# Patient Record
Sex: FEMALE | Race: BLACK OR AFRICAN AMERICAN | NOT HISPANIC OR LATINO
[De-identification: names, ages, dates, MRNs, and addresses within clinical notes are randomized per-mention and may not be internally consistent; named-entity substitution may affect disease eponyms.]

---

## 2020-04-26 ENCOUNTER — MESSAGE (OUTPATIENT)
Age: 40
End: 2020-04-26

## 2020-05-14 LAB
SARS-COV-2 IGG SERPL IA-ACNC: 6.9 INDEX
SARS-COV-2 IGG SERPL QL IA: POSITIVE

## 2020-09-30 ENCOUNTER — EMERGENCY (EMERGENCY)
Facility: HOSPITAL | Age: 40
LOS: 1 days | Discharge: HOME | End: 2020-09-30
Attending: STUDENT IN AN ORGANIZED HEALTH CARE EDUCATION/TRAINING PROGRAM
Payer: COMMERCIAL

## 2020-09-30 VITALS
TEMPERATURE: 98 F | SYSTOLIC BLOOD PRESSURE: 149 MMHG | OXYGEN SATURATION: 100 % | HEART RATE: 92 BPM | HEIGHT: 66 IN | RESPIRATION RATE: 18 BRPM | DIASTOLIC BLOOD PRESSURE: 104 MMHG | WEIGHT: 195.11 LBS

## 2020-09-30 VITALS
SYSTOLIC BLOOD PRESSURE: 121 MMHG | RESPIRATION RATE: 18 BRPM | HEART RATE: 79 BPM | OXYGEN SATURATION: 100 % | DIASTOLIC BLOOD PRESSURE: 77 MMHG | TEMPERATURE: 98 F

## 2020-09-30 DIAGNOSIS — N93.9 ABNORMAL UTERINE AND VAGINAL BLEEDING, UNSPECIFIED: ICD-10-CM

## 2020-09-30 LAB
ANION GAP SERPL CALC-SCNC: 6 MMOL/L — SIGNIFICANT CHANGE UP (ref 5–17)
APPEARANCE UR: CLEAR — SIGNIFICANT CHANGE UP
APTT BLD: 32.7 SEC — SIGNIFICANT CHANGE UP (ref 27.5–35.5)
BASOPHILS # BLD AUTO: 0.02 K/UL — SIGNIFICANT CHANGE UP (ref 0–0.2)
BASOPHILS NFR BLD AUTO: 0.3 % — SIGNIFICANT CHANGE UP (ref 0–2)
BILIRUB UR-MCNC: NEGATIVE — SIGNIFICANT CHANGE UP
BLD GP AB SCN SERPL QL: SIGNIFICANT CHANGE UP
BUN SERPL-MCNC: 7 MG/DL — SIGNIFICANT CHANGE UP (ref 7–23)
CALCIUM SERPL-MCNC: 8.8 MG/DL — SIGNIFICANT CHANGE UP (ref 8.5–10.1)
CHLORIDE SERPL-SCNC: 105 MMOL/L — SIGNIFICANT CHANGE UP (ref 96–108)
CO2 SERPL-SCNC: 26 MMOL/L — SIGNIFICANT CHANGE UP (ref 22–31)
COLOR SPEC: YELLOW — SIGNIFICANT CHANGE UP
CREAT SERPL-MCNC: 0.56 MG/DL — SIGNIFICANT CHANGE UP (ref 0.5–1.3)
DIFF PNL FLD: ABNORMAL
EOSINOPHIL # BLD AUTO: 0.02 K/UL — SIGNIFICANT CHANGE UP (ref 0–0.5)
EOSINOPHIL NFR BLD AUTO: 0.3 % — SIGNIFICANT CHANGE UP (ref 0–6)
EPI CELLS # UR: SIGNIFICANT CHANGE UP
GLUCOSE SERPL-MCNC: 210 MG/DL — HIGH (ref 70–99)
GLUCOSE UR QL: 1000 MG/DL
HCG SERPL-ACNC: 375 MIU/ML — HIGH
HCG UR QL: POSITIVE
HCT VFR BLD CALC: 34.3 % — LOW (ref 34.5–45)
HGB BLD-MCNC: 11.3 G/DL — LOW (ref 11.5–15.5)
IMM GRANULOCYTES NFR BLD AUTO: 0.3 % — SIGNIFICANT CHANGE UP (ref 0–1.5)
INR BLD: 1.15 RATIO — SIGNIFICANT CHANGE UP (ref 0.88–1.16)
KETONES UR-MCNC: NEGATIVE — SIGNIFICANT CHANGE UP
LEUKOCYTE ESTERASE UR-ACNC: NEGATIVE — SIGNIFICANT CHANGE UP
LYMPHOCYTES # BLD AUTO: 2.25 K/UL — SIGNIFICANT CHANGE UP (ref 1–3.3)
LYMPHOCYTES # BLD AUTO: 29.2 % — SIGNIFICANT CHANGE UP (ref 13–44)
MCHC RBC-ENTMCNC: 26.8 PG — LOW (ref 27–34)
MCHC RBC-ENTMCNC: 32.9 GM/DL — SIGNIFICANT CHANGE UP (ref 32–36)
MCV RBC AUTO: 81.5 FL — SIGNIFICANT CHANGE UP (ref 80–100)
MONOCYTES # BLD AUTO: 0.57 K/UL — SIGNIFICANT CHANGE UP (ref 0–0.9)
MONOCYTES NFR BLD AUTO: 7.4 % — SIGNIFICANT CHANGE UP (ref 2–14)
NEUTROPHILS # BLD AUTO: 4.83 K/UL — SIGNIFICANT CHANGE UP (ref 1.8–7.4)
NEUTROPHILS NFR BLD AUTO: 62.5 % — SIGNIFICANT CHANGE UP (ref 43–77)
NITRITE UR-MCNC: NEGATIVE — SIGNIFICANT CHANGE UP
NRBC # BLD: 0 /100 WBCS — SIGNIFICANT CHANGE UP (ref 0–0)
PH UR: 7 — SIGNIFICANT CHANGE UP (ref 5–8)
PLATELET # BLD AUTO: 268 K/UL — SIGNIFICANT CHANGE UP (ref 150–400)
POTASSIUM SERPL-MCNC: 3.5 MMOL/L — SIGNIFICANT CHANGE UP (ref 3.5–5.3)
POTASSIUM SERPL-SCNC: 3.5 MMOL/L — SIGNIFICANT CHANGE UP (ref 3.5–5.3)
PROT UR-MCNC: NEGATIVE MG/DL — SIGNIFICANT CHANGE UP
PROTHROM AB SERPL-ACNC: 13.2 SEC — SIGNIFICANT CHANGE UP (ref 10.6–13.6)
RBC # BLD: 4.21 M/UL — SIGNIFICANT CHANGE UP (ref 3.8–5.2)
RBC # FLD: 13.7 % — SIGNIFICANT CHANGE UP (ref 10.3–14.5)
RBC CASTS # UR COMP ASSIST: ABNORMAL /HPF (ref 0–4)
SODIUM SERPL-SCNC: 137 MMOL/L — SIGNIFICANT CHANGE UP (ref 135–145)
SP GR SPEC: 1.01 — SIGNIFICANT CHANGE UP (ref 1.01–1.02)
UROBILINOGEN FLD QL: NEGATIVE MG/DL — SIGNIFICANT CHANGE UP
WBC # BLD: 7.71 K/UL — SIGNIFICANT CHANGE UP (ref 3.8–10.5)
WBC # FLD AUTO: 7.71 K/UL — SIGNIFICANT CHANGE UP (ref 3.8–10.5)

## 2020-09-30 PROCEDURE — 99285 EMERGENCY DEPT VISIT HI MDM: CPT

## 2020-09-30 PROCEDURE — 76830 TRANSVAGINAL US NON-OB: CPT | Mod: 26

## 2020-09-30 RX ORDER — SODIUM CHLORIDE 9 MG/ML
1000 INJECTION INTRAMUSCULAR; INTRAVENOUS; SUBCUTANEOUS ONCE
Refills: 0 | Status: COMPLETED | OUTPATIENT
Start: 2020-09-30 | End: 2020-09-30

## 2020-09-30 RX ADMIN — SODIUM CHLORIDE 1000 MILLILITER(S): 9 INJECTION INTRAMUSCULAR; INTRAVENOUS; SUBCUTANEOUS at 12:18

## 2020-09-30 NOTE — ED PROVIDER NOTE - CLINICAL SUMMARY MEDICAL DECISION MAKING FREE TEXT BOX
39 yo F with positive home pregnancy test, no confirmed IUP, presenting with vaginal bleeding. Labs including t&s, IVF, analgesia, US r/o ectopic. VSS, no signs of symptomatic anemia.

## 2020-09-30 NOTE — ED ADULT NURSE NOTE - NSIMPLEMENTINTERV_GEN_ALL_ED
Implemented All Universal Safety Interventions:  [place] to call system. Call bell, personal items and telephone within reach. Instruct patient to call for assistance. Room bathroom lighting operational. Non-slip footwear when patient is off stretcher. Physically safe environment: no spills, clutter or unnecessary equipment. Stretcher in lowest position, wheels locked, appropriate side rails in place.
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
## 2020-09-30 NOTE — ED ADULT TRIAGE NOTE - CHIEF COMPLAINT QUOTE
pt c/o vaginal bleed since Tuesday mild to moderate with clots. pt states she tested positive but no confirmed IUP. c/o generalize pain and cramping. "this is the feeling I get when I have my period"

## 2020-09-30 NOTE — ED PROVIDER NOTE - PHYSICAL EXAMINATION
VITALS: reviewed  GEN: NAD, A & O x 4  HEAD/EYES: NCAT, PERRL, EOMI, anicteric sclerae, no conjunctival pallor  ENT: mucus membranes moist, oropharynx WNL, trachea midline  RESP: lungs CTA with equal breath sounds bilaterally, chest wall nontender and atraumatic  CV: heart with reg rhythm S1, S2, no murmur; distal pulses intact and symmetric bilaterally  ABDOMEN: normoactive bowel sounds, soft, nondistended, suprapubic ttp, no palpable masses  : no CVAT, normal external genitalia, blood pooling in vault unable to visualize os ([employer] student)  MSK: extremities atraumatic and nontender, no edema, no asymmetry.   SKIN: warm, dry, no rash, no bruising, no cyanosis. color appropriate for ethnicity  NEURO: alert, mentating appropriately, no facial asymmetry. gross sensation, motor, coordination are intact  PSYCH: Affect appropriate  '

## 2020-09-30 NOTE — ED ADULT NURSE NOTE - OBJECTIVE STATEMENT
pt c/o vaginal bleeding started yesterday and intermittent lower abdominal pain started today. pt positive pregnancy test. lmp 8/11/2020

## 2020-09-30 NOTE — ED PROVIDER NOTE - PROGRESS NOTE DETAILS
Cecilia: hcg 300, no iup or ectopic seen on  US, discussed results with patient and pregnancy of unknown origin which may be ectopic versus miscarriage. Patient instructed to return to ED in 48 hours for repeat beta-hcg check and need for repeat US in one week with ob fu. discussed with Dr. Colon.

## 2020-09-30 NOTE — ED PROVIDER NOTE - NSFOLLOWUPINSTRUCTIONS_ED_ALL_ED_FT
You presented to the ED with vaginal bleeding in the setting of pregnancy. No pregnancy can be seen in your uterus at this time, so we are unable to confirm the location of the pregnancy. It is possible you have had a miscarriage or it is possible that the pregnancy is outside of the uterus (ectopic pregnancy) which can cause rupture and bleeding and can be life threatening. It is important you return to the emergency department in two days to have your pregnancy hormone (HCG) checked and that you have a repeat ultrasound done within a week to determine location of pregnancy. Return to ED right away if you are soaking through more than 2 pads per hour, if you develop palpitations, shortness of breath, dizziness, or if you feel like you are going to pass out.

## 2020-09-30 NOTE — ED PROVIDER NOTE - CARE PROVIDER_API CALL
Ezra Colon  OBSTETRICS AND GYNECOLOGY  [address] [place] [address]  Phone: [phone]  Fax: [phone]  Follow Up Time:

## 2020-10-01 LAB
CULTURE RESULTS: SIGNIFICANT CHANGE UP
SPECIMEN SOURCE: SIGNIFICANT CHANGE UP

## 2020-10-02 ENCOUNTER — EMERGENCY (EMERGENCY)
Facility: HOSPITAL | Age: 40
LOS: 0 days | Discharge: HOME | End: 2020-10-02
Attending: STUDENT IN AN ORGANIZED HEALTH CARE EDUCATION/TRAINING PROGRAM
Payer: COMMERCIAL

## 2020-10-02 VITALS
RESPIRATION RATE: 17 BRPM | TEMPERATURE: 99 F | OXYGEN SATURATION: 100 % | SYSTOLIC BLOOD PRESSURE: 125 MMHG | HEIGHT: 66 IN | WEIGHT: 195.11 LBS | HEART RATE: 71 BPM | DIASTOLIC BLOOD PRESSURE: 78 MMHG

## 2020-10-02 DIAGNOSIS — O03.9 COMPLETE OR UNSPECIFIED SPONTANEOUS ABORTION WITHOUT COMPLICATION: ICD-10-CM

## 2020-10-02 LAB
ANION GAP SERPL CALC-SCNC: 4 MMOL/L — LOW (ref 5–17)
BUN SERPL-MCNC: 6 MG/DL — LOW (ref 7–23)
CALCIUM SERPL-MCNC: 9 MG/DL — SIGNIFICANT CHANGE UP (ref 8.5–10.1)
CHLORIDE SERPL-SCNC: 105 MMOL/L — SIGNIFICANT CHANGE UP (ref 96–108)
CO2 SERPL-SCNC: 29 MMOL/L — SIGNIFICANT CHANGE UP (ref 22–31)
CREAT SERPL-MCNC: 0.57 MG/DL — SIGNIFICANT CHANGE UP (ref 0.5–1.3)
GLUCOSE SERPL-MCNC: 161 MG/DL — HIGH (ref 70–99)
HCG SERPL-ACNC: 76 MIU/ML — HIGH
HCT VFR BLD CALC: 35.7 % — SIGNIFICANT CHANGE UP (ref 34.5–45)
HGB BLD-MCNC: 11.5 G/DL — SIGNIFICANT CHANGE UP (ref 11.5–15.5)
MCHC RBC-ENTMCNC: 26.6 PG — LOW (ref 27–34)
MCHC RBC-ENTMCNC: 32.2 GM/DL — SIGNIFICANT CHANGE UP (ref 32–36)
MCV RBC AUTO: 82.4 FL — SIGNIFICANT CHANGE UP (ref 80–100)
NRBC # BLD: 0 /100 WBCS — SIGNIFICANT CHANGE UP (ref 0–0)
PLATELET # BLD AUTO: 291 K/UL — SIGNIFICANT CHANGE UP (ref 150–400)
POTASSIUM SERPL-MCNC: 4.3 MMOL/L — SIGNIFICANT CHANGE UP (ref 3.5–5.3)
POTASSIUM SERPL-SCNC: 4.3 MMOL/L — SIGNIFICANT CHANGE UP (ref 3.5–5.3)
RBC # BLD: 4.33 M/UL — SIGNIFICANT CHANGE UP (ref 3.8–5.2)
RBC # FLD: 13.7 % — SIGNIFICANT CHANGE UP (ref 10.3–14.5)
SODIUM SERPL-SCNC: 138 MMOL/L — SIGNIFICANT CHANGE UP (ref 135–145)
WBC # BLD: 5.65 K/UL — SIGNIFICANT CHANGE UP (ref 3.8–10.5)
WBC # FLD AUTO: 5.65 K/UL — SIGNIFICANT CHANGE UP (ref 3.8–10.5)

## 2020-10-02 PROCEDURE — 99284 EMERGENCY DEPT VISIT MOD MDM: CPT

## 2020-10-02 NOTE — ED PROVIDER NOTE - PATIENT PORTAL LINK FT
You can access the FollowMyHealth Patient Portal offered by [facility] by registering at the following website: [email or web address]. By joining [employer]’s FollowMyHealth portal, you will also be able to view your health information using other applications (apps) compatible with our system.

## 2020-10-02 NOTE — ED ADULT NURSE NOTE - CHIEF COMPLAINT QUOTE
39 y/o female with no PMH. Presents to the ED with vaginal bleeding for the past 3 days. lmp 8/11/2020. Was at the hospital on Wednesday and told to come back for repeat hcg blood work. pt has intermittent lower pelvic pain, and scant bleeding on panty liner.

## 2020-10-02 NOTE — ED PROVIDER NOTE - OBJECTIVE STATEMENT
40F seen in ED 3 days ago for vaginal bleeding in pregnancy. d/c after workup showing no IUP. returning for [facility] trend. since d/c decreased bleeding. feeling well.

## 2020-10-02 NOTE — ED PROVIDER NOTE - CLINICAL SUMMARY MEDICAL DECISION MAKING FREE TEXT BOX
VS wnl. Hgb lateral. hcg downtrending from 375 > 76. likely representing loss of pregnancy. patient has ob follow up. no hemmorhage. pelvic exam deferred per patient request. d/c. rh+ per prior testing

## 2020-10-02 NOTE — ED ADULT TRIAGE NOTE - CHIEF COMPLAINT QUOTE
41 y/o female with no PMH. Presents to the ED with vaginal bleeding for the past 3 days. lmp 8/11/2020. Was at the hospital on Wednesday and told to come back for repeat hcg blood work. pt has intermittent lower pelvic pain, and scant bleeding on panty liner.

## 2021-01-21 ENCOUNTER — EMERGENCY (EMERGENCY)
Facility: HOSPITAL | Age: 41
LOS: 0 days | Discharge: HOME | End: 2021-01-21
Attending: EMERGENCY MEDICINE
Payer: COMMERCIAL

## 2021-01-21 VITALS
RESPIRATION RATE: 18 BRPM | HEART RATE: 78 BPM | DIASTOLIC BLOOD PRESSURE: 81 MMHG | SYSTOLIC BLOOD PRESSURE: 120 MMHG | TEMPERATURE: 98 F | OXYGEN SATURATION: 99 %

## 2021-01-21 VITALS
HEART RATE: 74 BPM | OXYGEN SATURATION: 100 % | TEMPERATURE: 98 F | DIASTOLIC BLOOD PRESSURE: 94 MMHG | HEIGHT: 66 IN | WEIGHT: 197.98 LBS | SYSTOLIC BLOOD PRESSURE: 124 MMHG | RESPIRATION RATE: 16 BRPM
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
## 2021-01-21 DIAGNOSIS — M54.9 DORSALGIA, UNSPECIFIED: ICD-10-CM
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
## 2021-01-21 DIAGNOSIS — S13.4XXA SPRAIN OF LIGAMENTS OF CERVICAL SPINE, INITIAL ENCOUNTER: ICD-10-CM

## 2021-01-21 DIAGNOSIS — Z04.1 ENCOUNTER FOR EXAMINATION AND OBSERVATION FOLLOWING TRANSPORT ACCIDENT: ICD-10-CM

## 2021-01-21 DIAGNOSIS — M79.10 MYALGIA, UNSPECIFIED SITE: ICD-10-CM

## 2021-01-21 DIAGNOSIS — E11.65 TYPE 2 DIABETES MELLITUS WITH HYPERGLYCEMIA: ICD-10-CM

## 2021-01-21 DIAGNOSIS — M54.2 CERVICALGIA: ICD-10-CM

## 2021-01-21 LAB
ALBUMIN SERPL ELPH-MCNC: 3.5 G/DL — SIGNIFICANT CHANGE UP (ref 3.3–5)
ALP SERPL-CCNC: 115 U/L — SIGNIFICANT CHANGE UP (ref 40–120)
ALT FLD-CCNC: 28 U/L — SIGNIFICANT CHANGE UP (ref 12–78)
ANION GAP SERPL CALC-SCNC: 7 MMOL/L — SIGNIFICANT CHANGE UP (ref 5–17)
APTT BLD: 20.6 SEC — LOW (ref 27.5–35.5)
AST SERPL-CCNC: 22 U/L — SIGNIFICANT CHANGE UP (ref 15–37)
BASOPHILS # BLD AUTO: 0.01 K/UL — SIGNIFICANT CHANGE UP (ref 0–0.2)
BASOPHILS NFR BLD AUTO: 0.1 % — SIGNIFICANT CHANGE UP (ref 0–2)
BILIRUB SERPL-MCNC: 0.3 MG/DL — SIGNIFICANT CHANGE UP (ref 0.2–1.2)
BUN SERPL-MCNC: 9 MG/DL — SIGNIFICANT CHANGE UP (ref 7–23)
CALCIUM SERPL-MCNC: 9.2 MG/DL — SIGNIFICANT CHANGE UP (ref 8.5–10.1)
CHLORIDE SERPL-SCNC: 102 MMOL/L — SIGNIFICANT CHANGE UP (ref 96–108)
CO2 SERPL-SCNC: 25 MMOL/L — SIGNIFICANT CHANGE UP (ref 22–31)
CREAT SERPL-MCNC: 0.75 MG/DL — SIGNIFICANT CHANGE UP (ref 0.5–1.3)
EOSINOPHIL # BLD AUTO: 0.01 K/UL — SIGNIFICANT CHANGE UP (ref 0–0.5)
EOSINOPHIL NFR BLD AUTO: 0.1 % — SIGNIFICANT CHANGE UP (ref 0–6)
GLUCOSE SERPL-MCNC: 285 MG/DL — HIGH (ref 70–99)
HCG SERPL-ACNC: <1 MIU/ML — SIGNIFICANT CHANGE UP
HCT VFR BLD CALC: 41.5 % — SIGNIFICANT CHANGE UP (ref 34.5–45)
HGB BLD-MCNC: 13.3 G/DL — SIGNIFICANT CHANGE UP (ref 11.5–15.5)
IMM GRANULOCYTES NFR BLD AUTO: 0.3 % — SIGNIFICANT CHANGE UP (ref 0–1.5)
INR BLD: 1.07 RATIO — SIGNIFICANT CHANGE UP (ref 0.88–1.16)
LYMPHOCYTES # BLD AUTO: 3.21 K/UL — SIGNIFICANT CHANGE UP (ref 1–3.3)
LYMPHOCYTES # BLD AUTO: 47.6 % — HIGH (ref 13–44)
MCHC RBC-ENTMCNC: 26.1 PG — LOW (ref 27–34)
MCHC RBC-ENTMCNC: 32 GM/DL — SIGNIFICANT CHANGE UP (ref 32–36)
MCV RBC AUTO: 81.4 FL — SIGNIFICANT CHANGE UP (ref 80–100)
MONOCYTES # BLD AUTO: 0.52 K/UL — SIGNIFICANT CHANGE UP (ref 0–0.9)
MONOCYTES NFR BLD AUTO: 7.7 % — SIGNIFICANT CHANGE UP (ref 2–14)
NEUTROPHILS # BLD AUTO: 2.98 K/UL — SIGNIFICANT CHANGE UP (ref 1.8–7.4)
NEUTROPHILS NFR BLD AUTO: 44.2 % — SIGNIFICANT CHANGE UP (ref 43–77)
NRBC # BLD: 0 /100 WBCS — SIGNIFICANT CHANGE UP (ref 0–0)
PLATELET # BLD AUTO: 245 K/UL — SIGNIFICANT CHANGE UP (ref 150–400)
POTASSIUM SERPL-MCNC: 3.9 MMOL/L — SIGNIFICANT CHANGE UP (ref 3.5–5.3)
POTASSIUM SERPL-SCNC: 3.9 MMOL/L — SIGNIFICANT CHANGE UP (ref 3.5–5.3)
PROT SERPL-MCNC: 8.1 GM/DL — SIGNIFICANT CHANGE UP (ref 6–8.3)
PROTHROM AB SERPL-ACNC: 12.4 SEC — SIGNIFICANT CHANGE UP (ref 10.6–13.6)
RBC # BLD: 5.1 M/UL — SIGNIFICANT CHANGE UP (ref 3.8–5.2)
RBC # FLD: 13 % — SIGNIFICANT CHANGE UP (ref 10.3–14.5)
SODIUM SERPL-SCNC: 134 MMOL/L — LOW (ref 135–145)
WBC # BLD: 6.75 K/UL — SIGNIFICANT CHANGE UP (ref 3.8–10.5)
WBC # FLD AUTO: 6.75 K/UL — SIGNIFICANT CHANGE UP (ref 3.8–10.5)

## 2021-01-21 PROCEDURE — 72125 CT NECK SPINE W/O DYE: CPT | Mod: 26

## 2021-01-21 PROCEDURE — 99053 MED SERV 10PM-8AM 24 HR FAC: CPT

## 2021-01-21 PROCEDURE — 99284 EMERGENCY DEPT VISIT MOD MDM: CPT

## 2021-01-21 PROCEDURE — 70450 CT HEAD/BRAIN W/O DYE: CPT | Mod: 26

## 2021-01-21 PROCEDURE — 71260 CT THORAX DX C+: CPT | Mod: 26,MA

## 2021-01-21 PROCEDURE — 73562 X-RAY EXAM OF KNEE 3: CPT | Mod: 26,50

## 2021-01-21 PROCEDURE — 74177 CT ABD & PELVIS W/CONTRAST: CPT | Mod: 26,MA

## 2021-01-21 PROCEDURE — 73030 X-RAY EXAM OF SHOULDER: CPT | Mod: 26,50

## 2021-01-21 PROCEDURE — 71045 X-RAY EXAM CHEST 1 VIEW: CPT | Mod: 26

## 2021-01-21 RX ORDER — MORPHINE SULFATE 50 MG/1
4 CAPSULE, EXTENDED RELEASE ORAL ONCE
Refills: 0 | Status: DISCONTINUED | OUTPATIENT
Start: 2021-01-21 | End: 2021-01-21

## 2021-01-21 RX ORDER — METHOCARBAMOL 500 MG/1
1 TABLET, FILM COATED ORAL
Qty: 15 | Refills: 0
Start: 2021-01-21 | End: 2021-01-25

## 2021-01-21 RX ORDER — SODIUM CHLORIDE 9 MG/ML
1000 INJECTION INTRAMUSCULAR; INTRAVENOUS; SUBCUTANEOUS ONCE
Refills: 0 | Status: COMPLETED | OUTPATIENT
Start: 2021-01-21 | End: 2021-01-21

## 2021-01-21 RX ORDER — IBUPROFEN 200 MG
1 TABLET ORAL
Qty: 20 | Refills: 0
Start: 2021-01-21 | End: 2021-01-25

## 2021-01-21 RX ADMIN — SODIUM CHLORIDE 1000 MILLILITER(S): 9 INJECTION INTRAMUSCULAR; INTRAVENOUS; SUBCUTANEOUS at 03:45

## 2021-01-21 RX ADMIN — SODIUM CHLORIDE 1000 MILLILITER(S): 9 INJECTION INTRAMUSCULAR; INTRAVENOUS; SUBCUTANEOUS at 01:20

## 2021-01-21 RX ADMIN — MORPHINE SULFATE 4 MILLIGRAM(S): 50 CAPSULE, EXTENDED RELEASE ORAL at 01:20
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
## 2021-01-21 NOTE — ED PROVIDER NOTE - PHYSICAL EXAMINATION
Gen: Alert, distress with movement, laying quietly  Head: NC, AT, PERRL, EOMI, normal lids/conjunctiva  ENT: normal hearing, patent oropharynx without erythema/exudate, uvula midline  Neck: C collar, +midline neck ttp, meningismus/JVD, +Trachea midline  Pulm: Bilateral BS, normal resp effort, no wheeze/stridor/retractions  CV: RRR, no M/R/G, +dist pulses  Abd: soft, NT/ND, Negative [place] signs, +BS, no palpable masses  Mskel: no edema/erythema/cyanosis, diffuse mildline back and flank ttp, nojoint swelling  Skin: no rash, warm/dry  Neuro: AAOx3, no apparent sensory deficits, coordination intact, unable to examine motor strength

## 2021-01-21 NOTE — ED PROVIDER NOTE - WR INTERPRETATION 2
MSK XR negative - No fracture, No dislocation, No foreign bodyCXR negative - No pneumothorax, No opacities, No free air

## 2021-01-21 NOTE — ED PROVIDER NOTE - PATIENT PORTAL LINK FT
You can access the FollowMyHealth Patient Portal offered by [facility] by registering at the following website: [email or web address]. By joining [employer]’s FollowMyHealth portal, you will also be able to view your health information using other applications (apps) compatible with our system.
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
## 2021-12-24 ENCOUNTER — EMERGENCY (EMERGENCY)
Facility: HOSPITAL | Age: 41
LOS: 0 days | Discharge: HOME | End: 2021-12-24
Attending: STUDENT IN AN ORGANIZED HEALTH CARE EDUCATION/TRAINING PROGRAM
Payer: COMMERCIAL

## 2021-12-24 VITALS
TEMPERATURE: 98 F | HEART RATE: 88 BPM | SYSTOLIC BLOOD PRESSURE: 121 MMHG | WEIGHT: 205.03 LBS | RESPIRATION RATE: 19 BRPM | OXYGEN SATURATION: 99 % | HEIGHT: 66 IN | DIASTOLIC BLOOD PRESSURE: 81 MMHG

## 2021-12-24 VITALS
DIASTOLIC BLOOD PRESSURE: 70 MMHG | HEART RATE: 70 BPM | SYSTOLIC BLOOD PRESSURE: 107 MMHG | OXYGEN SATURATION: 99 % | RESPIRATION RATE: 17 BRPM
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
## 2021-12-24 DIAGNOSIS — E11.9 TYPE 2 DIABETES MELLITUS WITHOUT COMPLICATIONS: ICD-10-CM

## 2021-12-24 DIAGNOSIS — M54.2 CERVICALGIA: ICD-10-CM
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
## 2021-12-24 DIAGNOSIS — S29.012A STRAIN OF MUSCLE AND TENDON OF BACK WALL OF THORAX, INITIAL ENCOUNTER: ICD-10-CM

## 2021-12-24 DIAGNOSIS — M54.89 OTHER DORSALGIA: ICD-10-CM

## 2021-12-24 PROCEDURE — 99283 EMERGENCY DEPT VISIT LOW MDM: CPT

## 2021-12-24 RX ORDER — ACETAMINOPHEN 500 MG
975 TABLET ORAL ONCE
Refills: 0 | Status: COMPLETED | OUTPATIENT
Start: 2021-12-24 | End: 2021-12-24

## 2021-12-24 RX ORDER — DIAZEPAM 5 MG
5 TABLET ORAL ONCE
Refills: 0 | Status: DISCONTINUED | OUTPATIENT
Start: 2021-12-24 | End: 2021-12-24

## 2021-12-24 RX ORDER — LIDOCAINE 4 G/100G
1 CREAM TOPICAL ONCE
Refills: 0 | Status: COMPLETED | OUTPATIENT
Start: 2021-12-24 | End: 2021-12-24

## 2021-12-24 RX ORDER — METHOCARBAMOL 500 MG/1
750 TABLET, FILM COATED ORAL ONCE
Refills: 0 | Status: COMPLETED | OUTPATIENT
Start: 2021-12-24 | End: 2021-12-24

## 2021-12-24 RX ORDER — IBUPROFEN 200 MG
600 TABLET ORAL ONCE
Refills: 0 | Status: COMPLETED | OUTPATIENT
Start: 2021-12-24 | End: 2021-12-24

## 2021-12-24 RX ADMIN — Medication 975 MILLIGRAM(S): at 09:28

## 2021-12-24 RX ADMIN — LIDOCAINE 1 PATCH: 4 CREAM TOPICAL at 10:21

## 2021-12-24 RX ADMIN — METHOCARBAMOL 750 MILLIGRAM(S): 500 TABLET, FILM COATED ORAL at 10:24

## 2021-12-24 RX ADMIN — Medication 5 MILLIGRAM(S): at 11:51

## 2021-12-24 RX ADMIN — Medication 975 MILLIGRAM(S): at 11:41
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
## 2021-12-24 NOTE — ED PROVIDER NOTE - MUSCULOSKELETAL, MLM
Spine appears normal, no c/t/l midline spinal ttp or stepoff, c/t paraspinal hypertonicity without tenderness. strength and sensation intact.

## 2021-12-24 NOTE — ED ADULT NURSE NOTE - NSIMPLEMENTINTERV_GEN_ALL_ED
Implemented All Universal Safety Interventions:  [place] to call system. Call bell, personal items and telephone within reach. Instruct patient to call for assistance. Room bathroom lighting operational. Non-slip footwear when patient is off stretcher. Physically safe environment: no spills, clutter or unnecessary equipment. Stretcher in lowest position, wheels locked, appropriate side rails in place.

## 2021-12-24 NOTE — ED PROVIDER NOTE - NSFOLLOWUPINSTRUCTIONS_ED_ALL_ED_FT
No signs of emergency medical condition on today's workup.  Presumptive diagnosis made, but further evaluation may be required by your primary care doctor or specialist for a definitive diagnosis.  Therefore, follow up as directed and if symptoms change/worsen or any emergency conditions, please return to the ER.   you were seen in the emergency department today for back pain   you were given medication to help alleviate the pain.   would recommend back stretches and heat to help with the pain.   motrin 600mg every 6 hours, can also take tylenol 500-1000mg every 6 hours.   salonpas patches can be found over the counter.   take prescribed muscle relaxant as instructed.   follow up with orthopedics in the next week.     Back pain is very common in adults. The cause of back pain is rarely dangerous and the pain often gets better over time. The cause of your back pain may not be known and may include strain of muscles or ligaments, degeneration of the spinal disks, or arthritis. Occasionally the pain may radiate down your leg(s). Over-the-counter medicines to reduce pain and inflammation are often the most helpful. Stretching and remaining active frequently helps the healing process.     Low Back Strain  A strain is a stretch or tear in a muscle or the strong cords of tissue that attach muscle to bone (tendons). Strains of the lower back (lumbar spine) are a common cause of low back pain. A strain occurs when muscles or tendons are torn or are stretched beyond their limits. The muscles may become inflamed, resulting in pain and sudden muscle tightening (spasms). A strain can happen suddenly due to an injury (trauma), or it can develop gradually due to overuse.    What increases the risk?  The following factors may increase your risk of getting this condition:  Playing contact sports.  Participating in sports or activities that put excessive stress on the back and require a lot of bending and twisting, including:  Lifting weights or heavy objects, Gymnastics, Soccer, Figure skating, Snowboarding, Being overweight or obese, Having poor strength and flexibility.    What are the signs or symptoms?  Symptoms of this condition may include:  Sharp or dull pain in the lower back that does not go away. Pain may extend to the buttocks.  Stiffness.  Limited range of motion.  Inability to stand up straight due to stiffness or pain.  Muscle spasms.    How is this diagnosed?  This condition may be diagnosed based on:  Your symptoms, Your medical history, A physical exam, Your health care provider may push on certain areas of your back to determine the source of your pain. You may be asked to bend forward, backward, and side to side to assess the severity of your pain and your range of motion.  Imaging tests, such as:  X-rays, MRI.    How is this treated?  Treatment for this condition may include:  Applying heat and cold to the affected area.  Medicines to help relieve pain and to relax your muscles (muscle relaxants).  NSAIDs to help reduce swelling and discomfort.  Physical therapy.  When your symptoms improve, it is important to gradually return to your normal routine as soon as possible to reduce pain, avoid stiffness, and avoid loss of muscle strength. Generally, symptoms should improve within 6 weeks of treatment. However, recovery time varies.    Follow these instructions at home:  Managing pain, stiffness, and swelling     If directed, apply ice to the injured area during the first 24 hours after your injury.  Put ice in a plastic bag.  Place a towel between your skin and the bag.  Leave the ice on for 20 minutes, 2–3 times a day.  If directed, apply heat to the affected area as often as told by your health care provider. Use the heat source that your health care provider recommends, such as a moist heat pack or a heating pad.  Place a towel between your skin and the heat source.  Leave the heat on for 20–30 minutes.  Remove the heat if your skin turns bright red. This is especially important if you are unable to feel pain, heat, or cold. You may have a greater risk of getting burned.  Activity     Rest and return to your normal activities as told by your health care provider. Ask your health care provider what activities are safe for you.  Avoid activities that take a lot of effort (are strenuous) for as long as told by your health care provider.  Do exercises as told by your health care provider.  General instructions     Take over-the-counter and prescription medicines only as told by your health care provider.  If you have questions or concerns about safety while taking pain medicine, talk with your health care provider.  Do not drive or operate heavy machinery until you know how your pain medicine affects you.  Do not use any tobacco products, such as cigarettes, chewing tobacco, and e-cigarettes. Tobacco can delay bone healing. If you need help quitting, ask your health care provider.  Keep all follow-up visits as told by your health care provider. This is important.  How is this prevented?  Image Image Image Image Image   Warm up and stretch before being active.  Cool down and stretch after being active.  Give your body time to rest between periods of activity.  Avoid:  Being physically inactive for long periods at a time.  Exercising or playing sports when you are tired or in pain.  Use correct form when playing sports and lifting heavy objects.  Use good posture when sitting and standing.  Maintain a healthy weight.  Sleep on a mattress with medium firmness to support your back.  Make sure to use equipment that fits you, including shoes that fit well.  Be safe and responsible while being active to avoid falls.  Do at least 150 minutes of moderate-intensity exercise each week, such as brisk walking or water aerobics. Try a form of exercise that takes stress off your back, such as swimming or stationary cycling.  Maintain physical fitness, including:  Strength.  Flexibility.  Cardiovascular fitness.  Endurance.  Contact a health care provider if:  Your back pain does not improve after 6 weeks of treatment.  Your symptoms get worse.  Get help right away if:  Your back pain is severe.  You are unable to stand or walk.  You develop pain in your legs.  You develop weakness in your buttocks or legs.  You have difficulty controlling when you urinate or when you have a bowel movement.  This information is not intended to replace advice given to you by your health care provider. Make sure you discuss any questions you have with your health care provider.      SEEK IMMEDIATE MEDICAL CARE IF YOU HAVE ANY OF THE FOLLOWING SYMPTOMS: bowel or bladder control problems, unusual weakness or numbness in your arms or legs, nausea or vomiting, abdominal pain, fever, dizziness/lightheadedness.
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
## 2021-12-24 NOTE — ED PROVIDER NOTE - CARE PROVIDER_API CALL
Hina Boothe (DO)  Orthopaedic Surgery Surgery  [address] [facility], [address] [place] [address]  Phone: [phone]  Fax: [phone]  Follow Up Time:

## 2021-12-24 NOTE — ED PROVIDER NOTE - PATIENT PORTAL LINK FT
You can access the FollowMyHealth Patient Portal offered by [facility] by registering at the following website: [email or web address]. By joining [email or web address]’s FollowMyHealth portal, you will also be able to view your health information using other applications (apps) compatible with our system.
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
## 2022-06-24 NOTE — ED ADULT NURSE NOTE - NS ED NURSE IV DC DT
There are no preventive care reminders to display for this patient.    Patient is up to date, no discussion needed.    Recent PHQ 2/9 Score    PHQ 2:  Date Peds PHQ 2 Score Adult PHQ 2 Score Peds PHQ 2 Interpretation Adult PHQ 2 Interpretation   6/24/2022 - 0 - No further screening needed       PHQ 9:  Date Peds PHQ 9 Score Adult PHQ 9 Score   6/16/2021 - 0          21-Jan-2021 05:14

## 2022-06-27 NOTE — ED PROVIDER NOTE - NSDCPRINTRESULTS_ED_ALL_ED
Subjective   Patient ID: Renan Muir is a 12 y.o. ambidextrous male referred by [facility] ER is being seen for orthopaedic evaluation today for a left ankle injury. Injury of the Left Fibula (Reports jumping over a creek bed on 6/23/22 and landing with outstretched leg, bending foot upwards, seen at [facility] ER same day, placed in tall boot and given crutches)     Injury of the Left Fibula (Reports jumping over a creek bed on 6/23/22 and landing with outstretched leg, bending foot upwards, seen at [facility] ER same day, placed in tall boot and given crutches)       CHIEF COMPLAINT:    Left ankle pain and injury.    History of Present Illness    Acute Condition or Injury:    Date of Injury:  6-23-22  Exact Location of injury:   Mechanism of injury: Jump over creek bed and landed on outstretched leg and twisted ankle and foot.  Work related: []   Yes    [x]   No  Motor vehicle accident: []  Yes     [x]   No     12 year old that injured his left ankle as described above.  No head trauma or loss of consciousness.  No other injury or complaint.  Mother reports that he has a history of a left foot fracture about a year ago with a good recovery.  He plays football for his school and the football practices begin in 5 - 6 weeks.  He presents referred for orthopaedic evaluation of his acute left ankle injury.    Past Medical History:   Diagnosis Date   • Asthma    • Closed fracture of left foot     2021, treated with boot by Dr Valdes   • Closed fracture of right wrist     treated with cast 2018   • Hypertension         Past Surgical History:   Procedure Laterality Date   • ADENOIDECTOMY     • TONSILLECTOMY         No family history on file.    Social History     Socioeconomic History   • Marital status: Single   Tobacco Use   • Smoking status: Never Smoker   • Smokeless tobacco: Never Used   Substance and Sexual Activity   • Drug use: Never   • Sexual activity: Defer       Allergies   Allergen Reactions   • Lisinopril Other (See Comments)  "    , coughing       Review of Systems   Constitutional: Negative for fever.   Musculoskeletal: Positive for arthralgias (left ankle), gait problem and joint swelling.   Skin: Negative for color change, rash and wound.   Neurological: Positive for weakness (mild left ankle).   Psychiatric/Behavioral: Negative for confusion.     I have reviewed the medical and surgical history, family history, social history, medications, and/or allergies, and the review of systems of this report.    Objective   Temp 97.8 °F (36.6 °C) (Skin)   Ht 176 cm (69.29\")   Wt (!) 144 kg (317 lb 9.6 oz)   BMI 46.51 kg/m²   Physical Exam  Vitals reviewed.   Constitutional:       General: He is not in acute distress.     Appearance: He is well-developed.   Skin:     General: Skin is warm and dry.      Findings: No erythema or rash.   Neurological:      Mental Status: He is alert.   Psychiatric:         Speech: Speech normal.       Left Ankle Exam     Tenderness   The patient is experiencing tenderness in the deltoid, ATF and CF.   Swelling: mild    Range of Motion   Dorsiflexion: 5   Plantar flexion: 20     Muscle Strength   Dorsiflexion:  4/5   Plantar flexion:  5/5   Peroneal muscle:  4/5    Tests   Anterior drawer: 1+  Varus tilt: 1+    Other   Erythema: absent  Pulse: present         Extremity DVT signs are negative on physical exam with negative Herb sign and no calf pain   Neurologic Exam     Mental Status   Attention: normal.   Speech: speech is normal   Level of consciousness: alert  Knowledge: good.     Motor Exam   Overall muscle tone: normal    Gait, Coordination, and Reflexes     Gait  Gait: (antalgic left ankle limp with fracture boot and crutches support.)      Assessment & Plan     Independent Review of Radiographic Studies:    No new imaging done today.  Reviewed images and agree with radiologist interpretation.     Laboratory and Other Studies:  No new results reviewed today.     Medical Decision Making:    Stable initial " neurovascular and grade 3 ankle sprain exam.  Closed treatment of fracture and or dislocation.  Medications as prescribed and only as tolerated.  Physical and occupational therapy planned.  Activity, work and/or sports restrictions as appropriate.    Procedures     Diagnoses and all orders for this visit:    1. Grade 3 ankle sprain (Primary)    2. Sprain of anterior talofibular ligament of left ankle, initial encounter       Discussion of orthopaedic goals and activities and patient and mother expressed appreciation.  Risk, benefits, and merits of treatment alternatives reviewed with the patient and mother and questions answered  Regular exercise as tolerated  Guided on proper techniques for mobility, strength, agility and/or conditioning exercises  Ice, heat, and/or modalities as beneficial  Reduced physical activity as appropriate and avoid offending activity  Weight bearing parameters reviewed  Cast, splint or brace care and assistive device usage instructions given  Physical therapy program planned    Recommendations/Plan:  Exercise, medications, injections, other patient advice, and return appointment as noted.  Brace: Ankle air cast.  Referral: No referrals made at today's visit.  Test/Studies: No additional studies ordered at this time. Consider MRI or other imaging depending on clinical progress.  Surgery: Plan non-surgical treatment for current orthopaedic condition.  Work/Activity Status: Usual activities, routine exercise as tolerated, no strenuous activity. No contact sports.    Return in about 4 weeks (around 7/25/2022) for Recheck, XOA left ankle.  Patient and mother are encouraged and agreeable to call or return sooner for any issues or concerns.                    Patient requests all Lab and Radiology Results on their Discharge Instructions

## 2022-11-22 ENCOUNTER — EMERGENCY (EMERGENCY)
Facility: HOSPITAL | Age: 42
LOS: 0 days | Discharge: HOME | End: 2022-11-22
Attending: STUDENT IN AN ORGANIZED HEALTH CARE EDUCATION/TRAINING PROGRAM

## 2022-11-22 VITALS
DIASTOLIC BLOOD PRESSURE: 77 MMHG | TEMPERATURE: 99 F | RESPIRATION RATE: 18 BRPM | OXYGEN SATURATION: 99 % | HEART RATE: 89 BPM | SYSTOLIC BLOOD PRESSURE: 117 MMHG

## 2022-11-22 VITALS
WEIGHT: 207.01 LBS | SYSTOLIC BLOOD PRESSURE: 121 MMHG | HEIGHT: 66 IN | RESPIRATION RATE: 16 BRPM | DIASTOLIC BLOOD PRESSURE: 79 MMHG | HEART RATE: 79 BPM | TEMPERATURE: 98 F | OXYGEN SATURATION: 98 %

## 2022-11-22 DIAGNOSIS — R10.30 LOWER ABDOMINAL PAIN, UNSPECIFIED: ICD-10-CM

## 2022-11-22 DIAGNOSIS — N93.9 ABNORMAL UTERINE AND VAGINAL BLEEDING, UNSPECIFIED: ICD-10-CM

## 2022-11-22 DIAGNOSIS — E11.9 TYPE 2 DIABETES MELLITUS WITHOUT COMPLICATIONS: ICD-10-CM

## 2022-11-22 DIAGNOSIS — Z3A.01 LESS THAN 8 WEEKS GESTATION OF PREGNANCY: ICD-10-CM

## 2022-11-22 DIAGNOSIS — Z79.84 LONG TERM (CURRENT) USE OF ORAL HYPOGLYCEMIC DRUGS: ICD-10-CM

## 2022-11-22 DIAGNOSIS — O99.891 OTHER SPECIFIED DISEASES AND CONDITIONS COMPLICATING PREGNANCY: ICD-10-CM

## 2022-11-22 LAB
ALBUMIN SERPL ELPH-MCNC: 3.2 G/DL — LOW (ref 3.3–5)
ALP SERPL-CCNC: 91 U/L — SIGNIFICANT CHANGE UP (ref 40–120)
ALT FLD-CCNC: 21 U/L — SIGNIFICANT CHANGE UP (ref 12–78)
ANION GAP SERPL CALC-SCNC: 6 MMOL/L — SIGNIFICANT CHANGE UP (ref 5–17)
APPEARANCE UR: ABNORMAL
AST SERPL-CCNC: 19 U/L — SIGNIFICANT CHANGE UP (ref 15–37)
BASOPHILS # BLD AUTO: 0.02 K/UL — SIGNIFICANT CHANGE UP (ref 0–0.2)
BASOPHILS NFR BLD AUTO: 0.3 % — SIGNIFICANT CHANGE UP (ref 0–2)
BILIRUB SERPL-MCNC: 0.3 MG/DL — SIGNIFICANT CHANGE UP (ref 0.2–1.2)
BILIRUB UR-MCNC: NEGATIVE — SIGNIFICANT CHANGE UP
BLD GP AB SCN SERPL QL: SIGNIFICANT CHANGE UP
BUN SERPL-MCNC: 7 MG/DL — SIGNIFICANT CHANGE UP (ref 7–23)
CALCIUM SERPL-MCNC: 9 MG/DL — SIGNIFICANT CHANGE UP (ref 8.5–10.1)
CHLORIDE SERPL-SCNC: 106 MMOL/L — SIGNIFICANT CHANGE UP (ref 96–108)
CO2 SERPL-SCNC: 25 MMOL/L — SIGNIFICANT CHANGE UP (ref 22–31)
COLOR SPEC: ABNORMAL
CREAT SERPL-MCNC: 0.52 MG/DL — SIGNIFICANT CHANGE UP (ref 0.5–1.3)
DIFF PNL FLD: ABNORMAL
EGFR: 118 ML/MIN/1.73M2 — SIGNIFICANT CHANGE UP
EOSINOPHIL # BLD AUTO: 0.02 K/UL — SIGNIFICANT CHANGE UP (ref 0–0.5)
EOSINOPHIL NFR BLD AUTO: 0.3 % — SIGNIFICANT CHANGE UP (ref 0–6)
EPI CELLS # UR: SIGNIFICANT CHANGE UP
GLUCOSE SERPL-MCNC: 113 MG/DL — HIGH (ref 70–99)
GLUCOSE UR QL: NEGATIVE MG/DL — SIGNIFICANT CHANGE UP
HCG SERPL-ACNC: 1583 MIU/ML — HIGH
HCG UR QL: POSITIVE
HCT VFR BLD CALC: 35.8 % — SIGNIFICANT CHANGE UP (ref 34.5–45)
HGB BLD-MCNC: 11.1 G/DL — LOW (ref 11.5–15.5)
IMM GRANULOCYTES NFR BLD AUTO: 0.5 % — SIGNIFICANT CHANGE UP (ref 0–0.9)
KETONES UR-MCNC: NEGATIVE — SIGNIFICANT CHANGE UP
LACTATE SERPL-SCNC: 0.6 MMOL/L — LOW (ref 0.7–2)
LEUKOCYTE ESTERASE UR-ACNC: ABNORMAL
LYMPHOCYTES # BLD AUTO: 2.56 K/UL — SIGNIFICANT CHANGE UP (ref 1–3.3)
LYMPHOCYTES # BLD AUTO: 39.1 % — SIGNIFICANT CHANGE UP (ref 13–44)
MCHC RBC-ENTMCNC: 25.8 PG — LOW (ref 27–34)
MCHC RBC-ENTMCNC: 31 G/DL — LOW (ref 32–36)
MCV RBC AUTO: 83.1 FL — SIGNIFICANT CHANGE UP (ref 80–100)
MONOCYTES # BLD AUTO: 0.47 K/UL — SIGNIFICANT CHANGE UP (ref 0–0.9)
MONOCYTES NFR BLD AUTO: 7.2 % — SIGNIFICANT CHANGE UP (ref 2–14)
NEUTROPHILS # BLD AUTO: 3.45 K/UL — SIGNIFICANT CHANGE UP (ref 1.8–7.4)
NEUTROPHILS NFR BLD AUTO: 52.6 % — SIGNIFICANT CHANGE UP (ref 43–77)
NITRITE UR-MCNC: NEGATIVE — SIGNIFICANT CHANGE UP
NRBC # BLD: 0 /100 WBCS — SIGNIFICANT CHANGE UP (ref 0–0)
PH UR: 8 — SIGNIFICANT CHANGE UP (ref 5–8)
PLATELET # BLD AUTO: 289 K/UL — SIGNIFICANT CHANGE UP (ref 150–400)
POTASSIUM SERPL-MCNC: 4 MMOL/L — SIGNIFICANT CHANGE UP (ref 3.5–5.3)
POTASSIUM SERPL-SCNC: 4 MMOL/L — SIGNIFICANT CHANGE UP (ref 3.5–5.3)
PROT SERPL-MCNC: 7.5 GM/DL — SIGNIFICANT CHANGE UP (ref 6–8.3)
PROT UR-MCNC: 100 MG/DL
RBC # BLD: 4.31 M/UL — SIGNIFICANT CHANGE UP (ref 3.8–5.2)
RBC # FLD: 15.7 % — HIGH (ref 10.3–14.5)
RBC CASTS # UR COMP ASSIST: >50 /HPF (ref 0–4)
SODIUM SERPL-SCNC: 137 MMOL/L — SIGNIFICANT CHANGE UP (ref 135–145)
SP GR SPEC: 1.01 — SIGNIFICANT CHANGE UP (ref 1.01–1.02)
UROBILINOGEN FLD QL: NEGATIVE MG/DL — SIGNIFICANT CHANGE UP
WBC # BLD: 6.55 K/UL — SIGNIFICANT CHANGE UP (ref 3.8–10.5)
WBC # FLD AUTO: 6.55 K/UL — SIGNIFICANT CHANGE UP (ref 3.8–10.5)
WBC UR QL: SIGNIFICANT CHANGE UP

## 2022-11-22 PROCEDURE — 76856 US EXAM PELVIC COMPLETE: CPT | Mod: 26

## 2022-11-22 PROCEDURE — 99285 EMERGENCY DEPT VISIT HI MDM: CPT

## 2022-11-22 RX ORDER — ACETAMINOPHEN 500 MG
1000 TABLET ORAL ONCE
Refills: 0 | Status: COMPLETED | OUTPATIENT
Start: 2022-11-22 | End: 2022-11-22

## 2022-11-22 RX ORDER — ACETAMINOPHEN 500 MG
1 TABLET ORAL
Qty: 28 | Refills: 0
Start: 2022-11-22 | End: 2022-11-28

## 2022-11-22 RX ORDER — ACETAMINOPHEN 500 MG
975 TABLET ORAL ONCE
Refills: 0 | Status: COMPLETED | OUTPATIENT
Start: 2022-11-22 | End: 2022-11-22

## 2022-11-22 RX ORDER — SODIUM CHLORIDE 9 MG/ML
1000 INJECTION INTRAMUSCULAR; INTRAVENOUS; SUBCUTANEOUS ONCE
Refills: 0 | Status: COMPLETED | OUTPATIENT
Start: 2022-11-22 | End: 2022-11-22

## 2022-11-22 RX ADMIN — Medication 400 MILLIGRAM(S): at 06:40

## 2022-11-22 RX ADMIN — SODIUM CHLORIDE 1000 MILLILITER(S): 9 INJECTION INTRAMUSCULAR; INTRAVENOUS; SUBCUTANEOUS at 06:45

## 2022-11-22 RX ADMIN — Medication 975 MILLIGRAM(S): at 11:42

## 2022-11-22 NOTE — ED PROVIDER NOTE - NSFOLLOWUPINSTRUCTIONS_ED_ALL_ED_FT
You have a pregnancy of unknown location.    There are three possible scenarios:    1) You are very early in pregnancy   2) You are having a miscarriage (Unfortunately, this is most likely)  3) You have an ectopic pregnancy - a pregnancy in the wrong place (Least likely)    The only way to know for sure which of these is occurring is to repeat your HCG level and possibly ultrasound in 1 -2 weeks. Follow up with your OBGYN or the one listed or return to the ER.    Return to the ER immediately with severe pain or heavy bleeding.    Continue taking prenatal vitamins in the mean time.

## 2022-11-22 NOTE — ED ADULT NURSE NOTE - OBJECTIVE STATEMENT
Pt presents to the ED with c/o fo lower ABD pain and vaginal bleeding. Pt verbalized LAMP 09/19 with + pregnant test at home. Pt verbalize heavy bleeding and clots x1 day.

## 2022-11-22 NOTE — ED ADULT NURSE NOTE - HISTORY OF COVID-19 VACCINATION
pt c/o sudden onset CP and diaphoresis. EMS states pt was a-fib on HM, pt denies PMH of irregular heart rhythms. brought directly to room #1 Yes

## 2022-11-22 NOTE — ED ADULT NURSE NOTE - NSIMPLEMENTINTERV_GEN_ALL_ED
Implemented All Fall Risk Interventions:  [place] to call system. Call bell, personal items and telephone within reach. Instruct patient to call for assistance. Room bathroom lighting operational. Non-slip footwear when patient is off stretcher. Physically safe environment: no spills, clutter or unnecessary equipment. Stretcher in lowest position, wheels locked, appropriate side rails in place. Provide visual cue, wrist band, yellow gown, etc. Monitor gait and stability. Monitor for mental status changes and reorient to person, place, and time. Review medications for side effects contributing to fall risk. Reinforce activity limits and safety measures with patient and family.

## 2022-11-22 NOTE — ED PROVIDER NOTE - OBJECTIVE STATEMENT
42 year old female  presents today c/o vaginal bleeding, pt states that for the last one month she has been having vaginal bleeding with wiping, now since yesterday pt c/o lower abdominal cramping pain with vaginal bleeding with clots which continues today (-) chest pain (-) sob (-)weakness

## 2022-11-22 NOTE — ED PROVIDER NOTE - CLINICAL SUMMARY MEDICAL DECISION MAKING FREE TEXT BOX
iloabachie - pt sono shows no gest sac. likely miscarriage given her lmp was 9/19 (6 weeks). no hemorrhage. stable for dc and has OB fu 4 days.

## 2022-11-22 NOTE — ED PROVIDER NOTE - PATIENT PORTAL LINK FT
You can access the FollowMyHealth Patient Portal offered by [facility] by registering at the following website: [email or web address]. By joining [facility]’s FollowMyHealth portal, you will also be able to view your health information using other applications (apps) compatible with our system.

## 2022-11-22 NOTE — ED PROVIDER NOTE - PRINCIPAL DIAGNOSIS
Patient:   OSMAR ASH            MRN: [id number]            FIN: [id number]               Age:   48 years     Sex:  FEMALE     :  67   Associated Diagnoses:   None   Author:   CHULA JONES                                                              Date/Time of Surgery: 2018    PreOp Diagnosis: Menorrhagia    PostOp Diagnosis: Same with uterine fibroids    Procedures Performed: Robotically assisted total laparoscopic hysterectomy with bilateral salpingectomy, postoperative cystoscopy, IUD removal    Performed By Surgeon: Dr. Chula Ochoa    Assistant: Dr. YANET Richter    Anesthesia Used: General endotracheal    Estimated Blood Loss: 5 mL's    Complications: None    Findings: Pedunculated uterine fibroid approximately 3-4 cm off the left posterior wall of the uterus.  Normal ovaries and tubes.    Detail of Procedure: Patient brought to the operating room given general endotracheal anesthesia, placed in the lithotomy position prepped and draped in usual sterile manner.  A weighted speculum was inserted into the vagina and a single-tooth tenaculum was placed in the anterior lip of the cervix.  The IUD string was grasped with a ring forceps and removed intact.  Stay sutures then placed in the anterior lip of the cervix.  The uterus sounded to 9 cm.  The cervix was then dilated to a #8 Hegar Hegar dilator.  An 8 cm Heidi tip with a 3.5 cm co-cup was attached to Heidi arch introduced the endometrial cavity.  This was fixed within the intrauterine balloon.  This was affixed to the uterine positioning system.  Falk catheter was placed.    Attention was turned to the abdomen.  An intraumbilical incision was made with a knife and a varies needle was atraumatically inserted into the abdominal cavity.  Pneumoperitoneum was achieved without difficulty to the level of 50 mmHg.  A 12 mm camera port was then atraumatically inserted through this incision.  Camera was inserted.  Next under  direct visualization an 8 mm XL port was then placed in the left upper quadrant.  An 8 mm robotic ports were placed on the right and left lower quadrants.  The patient was placed in Trendelenburg and the robot was docked from patient's right side.  Pelvis was inspected.  The uterus was slightly irregular but also with a pedunculated 3-4 cm fibroid that came off the posterior left body of the uterus.  The ovaries are normal bilaterally the tubes were normal bilaterally.  There were no adhesions.  See her in the left side, the left fallopian tube was grasped inserted sealed and transected along the mesosalpinx and then proximally to excise the tube.  This was brought out through the assistant port.  The left round ligament was then sealed and transected.  The left utero-ovarian ligament was then sealed and transected.  The dissection carried down to the mesosalpinx into the broad ligament.  The anterior leaf of the broad ligament was opened anteriorly posterior to the posterior leaf of the broad ligament was opened posterior to the level of the uterosacral ligament.  The bladder was dissected down easily off the co-cup.  This isolated the uterine vessels on the left, which were then sealed and transected.  Attention then turned to the right side.  The right fallopian tube was grasped and sealed and transected along the attachment to the ovary and the mesosalpinx to the proximal portion was then sealed and transected and removed from the abdomen.  The right round ligament was then sealed and transected.  The right utero-ovarian ligament was sealed and transected.  This dissection carried down into the broad ligament on the right-hand side.  The anterior leaf the brother was opened to meet the previous dissection over the co-cup.  The posterior leaf the broad ligament was taken down to the level uterosacral ligament.  This isolated the right uterine vessels, which were then sealed and transected.  Colpotomy was then  performed along the top of the co-cup in a circumferential fashion using monopolar hong.  The specimen was removed through the vagina, but left partially N to maintain pneumoperitoneum.  The vaginal cuff was then closed in a running 2–0V lock suture.  There is good hemostasis.  A low pressure test revealed no bleeding.  Pelvis was lavaged thoroughly with warm saline.  The ovaries were normal.  And there is no bleeding.  Pneumoperitoneum was then released and all instruments were removed and the robot was undocked.  The fascia of the umbilicus was closed in a figure-of-eight 0 Vicryl.  The skin was closed in subcuticular fashion using 4-0 Vicryl in all incisions.  Dermabond was placed over these wounds.  The uterus was then removed from the vagina.  Falk catheter was removed.  Cystoscopy was performed.  There is good ureteral flow from both ureteral orifices.  The bladder was drained.  The patient then underwent a tap block.  She was then transferred to the recovery room in good condition.    Specimens Removed: Uterus, cervix, uterine fibroid, bilateral tubes    Grafts/Implants: [None ]                   Electronically Signed On 07/17/2018 12:43  __________________________________________________   CHULA JONES     Pregnancy of unknown anatomic location

## 2022-11-24 ENCOUNTER — EMERGENCY (EMERGENCY)
Facility: HOSPITAL | Age: 42
LOS: 0 days | Discharge: HOME | End: 2022-11-24
Attending: EMERGENCY MEDICINE

## 2022-11-24 VITALS
TEMPERATURE: 99 F | OXYGEN SATURATION: 100 % | DIASTOLIC BLOOD PRESSURE: 73 MMHG | HEART RATE: 80 BPM | RESPIRATION RATE: 16 BRPM | SYSTOLIC BLOOD PRESSURE: 111 MMHG

## 2022-11-24 VITALS
WEIGHT: 207.01 LBS | SYSTOLIC BLOOD PRESSURE: 142 MMHG | HEART RATE: 112 BPM | RESPIRATION RATE: 18 BRPM | TEMPERATURE: 98 F | OXYGEN SATURATION: 99 % | DIASTOLIC BLOOD PRESSURE: 94 MMHG | HEIGHT: 66 IN

## 2022-11-24 DIAGNOSIS — R10.2 PELVIC AND PERINEAL PAIN: ICD-10-CM

## 2022-11-24 DIAGNOSIS — N93.9 ABNORMAL UTERINE AND VAGINAL BLEEDING, UNSPECIFIED: ICD-10-CM

## 2022-11-24 DIAGNOSIS — Z79.84 LONG TERM (CURRENT) USE OF ORAL HYPOGLYCEMIC DRUGS: ICD-10-CM

## 2022-11-24 DIAGNOSIS — Z88.5 ALLERGY STATUS TO NARCOTIC AGENT: ICD-10-CM

## 2022-11-24 DIAGNOSIS — E11.9 TYPE 2 DIABETES MELLITUS WITHOUT COMPLICATIONS: ICD-10-CM

## 2022-11-24 DIAGNOSIS — O03.9 COMPLETE OR UNSPECIFIED SPONTANEOUS ABORTION WITHOUT COMPLICATION: ICD-10-CM

## 2022-11-24 LAB
ALBUMIN SERPL ELPH-MCNC: 3.3 G/DL — SIGNIFICANT CHANGE UP (ref 3.3–5)
ALP SERPL-CCNC: 90 U/L — SIGNIFICANT CHANGE UP (ref 40–120)
ALT FLD-CCNC: 25 U/L — SIGNIFICANT CHANGE UP (ref 12–78)
ANION GAP SERPL CALC-SCNC: 9 MMOL/L — SIGNIFICANT CHANGE UP (ref 5–17)
APPEARANCE UR: ABNORMAL
AST SERPL-CCNC: 24 U/L — SIGNIFICANT CHANGE UP (ref 15–37)
BASOPHILS # BLD AUTO: 0.03 K/UL — SIGNIFICANT CHANGE UP (ref 0–0.2)
BASOPHILS NFR BLD AUTO: 0.7 % — SIGNIFICANT CHANGE UP (ref 0–2)
BILIRUB SERPL-MCNC: 0.2 MG/DL — SIGNIFICANT CHANGE UP (ref 0.2–1.2)
BILIRUB UR-MCNC: NEGATIVE — SIGNIFICANT CHANGE UP
BUN SERPL-MCNC: 8 MG/DL — SIGNIFICANT CHANGE UP (ref 7–23)
CALCIUM SERPL-MCNC: 9.3 MG/DL — SIGNIFICANT CHANGE UP (ref 8.5–10.1)
CHLORIDE SERPL-SCNC: 109 MMOL/L — HIGH (ref 96–108)
CO2 SERPL-SCNC: 23 MMOL/L — SIGNIFICANT CHANGE UP (ref 22–31)
COLOR SPEC: ABNORMAL
CREAT SERPL-MCNC: 0.68 MG/DL — SIGNIFICANT CHANGE UP (ref 0.5–1.3)
DIFF PNL FLD: ABNORMAL
EGFR: 111 ML/MIN/1.73M2 — SIGNIFICANT CHANGE UP
EOSINOPHIL # BLD AUTO: 0.01 K/UL — SIGNIFICANT CHANGE UP (ref 0–0.5)
EOSINOPHIL NFR BLD AUTO: 0.2 % — SIGNIFICANT CHANGE UP (ref 0–6)
GLUCOSE SERPL-MCNC: 108 MG/DL — HIGH (ref 70–99)
GLUCOSE UR QL: NEGATIVE MG/DL — SIGNIFICANT CHANGE UP
HCG SERPL-ACNC: 323 MIU/ML — HIGH
HCG UR QL: POSITIVE
HCT VFR BLD CALC: 37 % — SIGNIFICANT CHANGE UP (ref 34.5–45)
HGB BLD-MCNC: 12.1 G/DL — SIGNIFICANT CHANGE UP (ref 11.5–15.5)
IMM GRANULOCYTES NFR BLD AUTO: 0.9 % — SIGNIFICANT CHANGE UP (ref 0–0.9)
KETONES UR-MCNC: ABNORMAL
LEUKOCYTE ESTERASE UR-ACNC: ABNORMAL
LYMPHOCYTES # BLD AUTO: 2.01 K/UL — SIGNIFICANT CHANGE UP (ref 1–3.3)
LYMPHOCYTES # BLD AUTO: 45.7 % — HIGH (ref 13–44)
MCHC RBC-ENTMCNC: 25.7 PG — LOW (ref 27–34)
MCHC RBC-ENTMCNC: 32.7 G/DL — SIGNIFICANT CHANGE UP (ref 32–36)
MCV RBC AUTO: 78.6 FL — LOW (ref 80–100)
MONOCYTES # BLD AUTO: 0.66 K/UL — SIGNIFICANT CHANGE UP (ref 0–0.9)
MONOCYTES NFR BLD AUTO: 15 % — HIGH (ref 2–14)
NEUTROPHILS # BLD AUTO: 1.65 K/UL — LOW (ref 1.8–7.4)
NEUTROPHILS NFR BLD AUTO: 37.5 % — LOW (ref 43–77)
NITRITE UR-MCNC: NEGATIVE — SIGNIFICANT CHANGE UP
NRBC # BLD: 0 /100 WBCS — SIGNIFICANT CHANGE UP (ref 0–0)
PH UR: 8 — SIGNIFICANT CHANGE UP (ref 5–8)
PLATELET # BLD AUTO: 290 K/UL — SIGNIFICANT CHANGE UP (ref 150–400)
POTASSIUM SERPL-MCNC: 4 MMOL/L — SIGNIFICANT CHANGE UP (ref 3.5–5.3)
POTASSIUM SERPL-SCNC: 4 MMOL/L — SIGNIFICANT CHANGE UP (ref 3.5–5.3)
PROT SERPL-MCNC: 7.8 GM/DL — SIGNIFICANT CHANGE UP (ref 6–8.3)
PROT UR-MCNC: 100 MG/DL
RBC # BLD: 4.71 M/UL — SIGNIFICANT CHANGE UP (ref 3.8–5.2)
RBC # FLD: 15.9 % — HIGH (ref 10.3–14.5)
RBC CASTS # UR COMP ASSIST: >50 /HPF (ref 0–4)
SODIUM SERPL-SCNC: 141 MMOL/L — SIGNIFICANT CHANGE UP (ref 135–145)
SP GR SPEC: 1.01 — SIGNIFICANT CHANGE UP (ref 1.01–1.02)
UROBILINOGEN FLD QL: NEGATIVE MG/DL — SIGNIFICANT CHANGE UP
WBC # BLD: 4.4 K/UL — SIGNIFICANT CHANGE UP (ref 3.8–10.5)
WBC # FLD AUTO: 4.4 K/UL — SIGNIFICANT CHANGE UP (ref 3.8–10.5)
WBC UR QL: NEGATIVE — SIGNIFICANT CHANGE UP

## 2022-11-24 PROCEDURE — 76801 OB US < 14 WKS SINGLE FETUS: CPT | Mod: 26

## 2022-11-24 PROCEDURE — 99285 EMERGENCY DEPT VISIT HI MDM: CPT

## 2022-11-24 RX ORDER — FENTANYL CITRATE 50 UG/ML
25 INJECTION INTRAVENOUS ONCE
Refills: 0 | Status: DISCONTINUED | OUTPATIENT
Start: 2022-11-24 | End: 2022-11-24

## 2022-11-24 RX ORDER — ACETAMINOPHEN 500 MG
975 TABLET ORAL ONCE
Refills: 0 | Status: COMPLETED | OUTPATIENT
Start: 2022-11-24 | End: 2022-11-24

## 2022-11-24 RX ORDER — ONDANSETRON 8 MG/1
4 TABLET, FILM COATED ORAL ONCE
Refills: 0 | Status: COMPLETED | OUTPATIENT
Start: 2022-11-24 | End: 2022-11-24

## 2022-11-24 RX ORDER — MORPHINE SULFATE 50 MG/1
2 CAPSULE, EXTENDED RELEASE ORAL ONCE
Refills: 0 | Status: DISCONTINUED | OUTPATIENT
Start: 2022-11-24 | End: 2022-11-24

## 2022-11-24 RX ADMIN — Medication 975 MILLIGRAM(S): at 08:11

## 2022-11-24 RX ADMIN — FENTANYL CITRATE 25 MICROGRAM(S): 50 INJECTION INTRAVENOUS at 08:51

## 2022-11-24 RX ADMIN — ONDANSETRON 4 MILLIGRAM(S): 8 TABLET, FILM COATED ORAL at 08:51

## 2022-11-24 NOTE — ED PROVIDER NOTE - PATIENT PORTAL LINK FT
You can access the FollowMyHealth Patient Portal offered by [facility] by registering at the following website: [email or web address]. By joining [facility]’s FollowMyHealth portal, you will also be able to view your health information using other applications (apps) compatible with our system.

## 2022-11-24 NOTE — ED ADULT NURSE NOTE - OBJECTIVE STATEMENT
Patient is a 42yr female with c/o vaginal bleeding and pelvic pain, stated that it began on Tuesday and was here for the same reason. Patient 9 wks pregnant and has a hx of dm and one previous miscarriage. Denies any SOB, dizziness or vomiting.

## 2022-11-24 NOTE — ED ADULT NURSE NOTE - NS_SISCREENINGSR_GEN_ALL_ED
Negative AAROM LLE WFL with mild-mod decreased hip/knee FLEXION due to pain thigh /hip/brooks. upper extremity Active ROM was WNL (within normal limits)/Right LE Active ROM was WFL (within functional limits)

## 2022-11-24 NOTE — ED PROVIDER NOTE - CLINICAL SUMMARY MEDICAL DECISION MAKING FREE TEXT BOX
Abdominal pain and vaginal bleeding in pregnancy unable to see IUP previously therefore still high suspicion for ectopic.  Will check HCG, US, give medication for pain and contact OB/GYN if positive findings.

## 2022-11-24 NOTE — ED PROVIDER NOTE - OBJECTIVE STATEMENT
This patient is a 42 year old woman A1 who presents to the ER c/o pelvic pain and vaginal bleeding x 2 days.  Patient was seen in the ER 2 days ago and states that at that time she had heavy vaginal bleeding.  Ultrasound did not show IUP.  Patient states that the vaginal bleeding has slowed down but today the pain became significantly worse.  She tried taking tylenol for pain but got no relief.  No vomiting no fever.  She has an appointment with her OB/GYN tomorrow.

## 2022-11-24 NOTE — ED PROVIDER NOTE - NSFOLLOWUPINSTRUCTIONS_ED_ALL_ED_FT
1) Follow-up with your GYN  2) Follow up with your primary care doctor  3) Return to the ER for worsening or concerning symptoms

## 2022-11-25 ENCOUNTER — APPOINTMENT (OUTPATIENT)
Dept: ULTRASOUND IMAGING | Facility: IMAGING CENTER | Age: 42
End: 2022-11-25

## 2022-11-25 ENCOUNTER — LABORATORY RESULT (OUTPATIENT)
Age: 42
End: 2022-11-25

## 2022-11-25 ENCOUNTER — APPOINTMENT (OUTPATIENT)
Dept: OBGYN | Facility: CLINIC | Age: 42
End: 2022-11-25

## 2022-11-25 ENCOUNTER — OUTPATIENT (OUTPATIENT)
Dept: OUTPATIENT SERVICES | Facility: HOSPITAL | Age: 42
LOS: 1 days | End: 2022-11-25
Payer: COMMERCIAL

## 2022-11-25 VITALS
DIASTOLIC BLOOD PRESSURE: 82 MMHG | WEIGHT: 205 LBS | BODY MASS INDEX: 32.95 KG/M2 | HEIGHT: 66 IN | SYSTOLIC BLOOD PRESSURE: 117 MMHG

## 2022-11-25 DIAGNOSIS — J45.909 UNSPECIFIED ASTHMA, UNCOMPLICATED: ICD-10-CM

## 2022-11-25 DIAGNOSIS — Z78.9 OTHER SPECIFIED HEALTH STATUS: ICD-10-CM

## 2022-11-25 DIAGNOSIS — N93.9 ABNORMAL UTERINE AND VAGINAL BLEEDING, UNSPECIFIED: ICD-10-CM

## 2022-11-25 DIAGNOSIS — N80.0 ENDOMETRIOSIS OF UTERUS: ICD-10-CM

## 2022-11-25 DIAGNOSIS — O03.4 INCOMPLETE SPONTANEOUS ABORTION W/OUT COMPLICATION: ICD-10-CM

## 2022-11-25 DIAGNOSIS — E11.9 TYPE 2 DIABETES MELLITUS W/OUT COMPLICATIONS: ICD-10-CM

## 2022-11-25 DIAGNOSIS — Z82.49 FAMILY HISTORY OF ISCHEMIC HEART DISEASE AND OTHER DISEASES OF THE CIRCULATORY SYSTEM: ICD-10-CM

## 2022-11-25 DIAGNOSIS — Z82.3 FAMILY HISTORY OF STROKE: ICD-10-CM

## 2022-11-25 DIAGNOSIS — Z83.3 FAMILY HISTORY OF DIABETES MELLITUS: ICD-10-CM

## 2022-11-25 LAB — HCG SERPL-MCNC: 216 MIU/ML

## 2022-11-25 PROCEDURE — 76830 TRANSVAGINAL US NON-OB: CPT

## 2022-11-25 PROCEDURE — 99203 OFFICE O/P NEW LOW 30 MIN: CPT

## 2022-11-25 PROCEDURE — 76830 TRANSVAGINAL US NON-OB: CPT | Mod: 26

## 2022-11-25 PROCEDURE — 76856 US EXAM PELVIC COMPLETE: CPT | Mod: 26,59

## 2022-11-25 PROCEDURE — 76856 US EXAM PELVIC COMPLETE: CPT

## 2022-11-25 PROCEDURE — 36415 COLL VENOUS BLD VENIPUNCTURE: CPT

## 2022-11-25 NOTE — HISTORY OF PRESENT ILLNESS
[Patient reported mammogram was normal] : Patient reported mammogram was normal [Patient reported PAP Smear was normal] : Patient reported PAP Smear was normal [Menarche Age ____] : age at menarche was [unfilled] [Definite ___ (Date)] : the last menstrual period was [unfilled] [Excessive Bleeding] : there was excessive bleeding [Irregular Cycle Intervals] : are  irregular [Bleeding Usually Lasts ___ Days] : usually last [unfilled] days [Dysmenorrhea] : dysmenorrhea [Y] : Positive pregnancy history [Menstrual Cramps] : menstrual cramps [FreeTextEntry1] : 43 y/o   presents for new patient GYN visit, follow up from ER visits on  and  @ [facility] for pelvic pain and abnormal vaginal bleeding. Patient was told in the ER she was most likely experiencing a miscarriage and "needed a D&C". \par \par LMP 2022; planned and desired pregnancy. \par Started spotting and had mild cramping on  and went to the ER on  for "heavy bleeding and worse pain". seen again for pain and bleeding on . Was advised to follow up with GYN. \par Reports lighter bleeding today with lower pelvic cramping relieved by otc Naproxen use. \par \par No N/V/D, fever, chills, urinary symptoms, HA/dizziness, palpitations, weakness. \par \par From ER recs: 22; o pos blood type, bhcg 1583, H/H ; sono, normal w/o IUP. \par 22: bhcg 323, H/H , sono noted blood inside uterus and possible tissue. \par \par GYN: menarche age 11, irregular menses, menorrhagia, dysmenorrhea, adenomyosis. Last pap 10/12/21 denies hx of abnormal pap/hpv; mammogram 2020.\par OB: mab x2 (10/3/2020, current); c/sx3 (98, 06, 5/3/15) \par MEDHX: Diabetes, Asthma\par SURGHX:  x3\par FAMHX: mother (DMHTN/stroke)\par MEDS: Ozempic 1mg weekly, Flexiba flextouch (insulin degludec) *stopped meds and taking NPH per endo with + pregnancy test. OTC Tylenol PRN for pain, Naproxen. \par ALL: MORPHINE (hives)\par Social: , employed, non-smoker, denies etoh/drug use.\par Accepts blood products. \par  [Mammogramdate] : 11/6/2020 [PapSmeardate] : 10/12/21 [LMPDate] : 9/19/2022 [PGHxTotal] : 5 [[facility]Term] : 3 [PGHxAbortions] : 2 [[facility] Centeriving] : 3 [PGHxABSpont] : 2

## 2022-11-25 NOTE — PLAN
[FreeTextEntry1] : Vaginal Bleeding, incomplete miscarriage\par I counseled the patient regarding early pregnancy loss.  Management options were reviewed including expectant management, medical management with misoprostol and surgical management.  Benefits, risks and contraindications were reviewed\par -cbc, T&S, bhcg drawn today\par -gc cx obtained\par -erx urgent sono-pt going today to evaluate poc/mab, need for D&C\par -Reviewed in detail need for urgent ER eval if new or worsening pain, increased bleeding >2 pads/hour, or s/s infection. \par -discussed trending down of bhcg levels; discussed d&C pending sono results. \par -pt worried about not being able to become pregnant in the future, recc MFM, preconception consult ([facility])\par \par will f/u pending blood/sono\par \par During this visit 40 minutes were spent face-to-face with greater than 50% of the time dedicated to counseling.\par

## 2022-11-25 NOTE — PROCEDURE
[GC Chlamydia Culture] : GC Chlamydia Culture [Tolerated Well] : the patient tolerated the procedure well [No Complications] : there were no complications [Pelvic Pain] : pelvic pain [Abnormal Uterine Bleeding] : abnormal uterine bleeding [Threatened Miscarriage] : threatened miscarriage [F/U Abnormal US] : f/u abnormal ultrasound [Transvaginal Ultrasound] : transvaginal ultrasound [FreeTextEntry9] : miscarriage [FreeTextEntry3] : difficult to visualize endometrium/cervical canal on bedside sono in office; sent for us at radiology to be done today.

## 2022-11-25 NOTE — COUNSELING
[Contraception/ Emergency Contraception/ Safe Sexual Practices] : contraception, emergency contraception, safe sexual practices [Preconception Care/ Fertility options] : preconception care, fertility options [Pregnancy Options] : pregnancy options [Confidentiality] : confidentiality [STD (testing, results, tx)] : STD (testing, results, tx) [Lab Results] : lab results [Medication Management] : medication management [Other ___] : [unfilled]

## 2022-11-25 NOTE — REASON FOR VISIT
[Initial] : an initial consultation for [Abnormal Uterine Bleeding] : abnormal uterine bleeding [Other: _____] : [unfilled] [FreeTextEntry2] : thinks she had a miscarriage [FreeTextEntry1] : ED @ [place]

## 2022-11-25 NOTE — PHYSICAL EXAM
[Chaperone Declined] : Patient declined chaperone [Appropriately responsive] : appropriately responsive [Alert] : alert [No Acute Distress] : no acute distress [Soft] : soft [Non-tender] : non-tender [Non-distended] : non-distended [No HSM] : No HSM [No Lesions] : no lesions [No Mass] : no mass [Oriented x3] : oriented x3 [Labia Majora] : normal [Labia Minora] : normal [Moderate] : There was moderate vaginal bleeding [Normal] : normal [Uterine Adnexae] : normal

## 2022-11-28 ENCOUNTER — APPOINTMENT (OUTPATIENT)
Dept: OBGYN | Facility: CLINIC | Age: 42
End: 2022-11-28

## 2022-11-28 VITALS
WEIGHT: 205 LBS | HEIGHT: 66 IN | SYSTOLIC BLOOD PRESSURE: 115 MMHG | BODY MASS INDEX: 32.95 KG/M2 | DIASTOLIC BLOOD PRESSURE: 78 MMHG

## 2022-11-28 LAB
ABO + RH PNL BLD: NORMAL
BASOPHILS # BLD AUTO: 0 K/UL
BASOPHILS NFR BLD AUTO: 0 %
BLD GP AB SCN SERPL QL: NORMAL
C TRACH RRNA SPEC QL NAA+PROBE: NOT DETECTED
EOSINOPHIL # BLD AUTO: 0 K/UL
EOSINOPHIL NFR BLD AUTO: 0 %
HCT VFR BLD CALC: 39.6 %
HGB BLD-MCNC: 12.5 G/DL
LYMPHOCYTES # BLD AUTO: 2.43 K/UL
LYMPHOCYTES NFR BLD AUTO: 68.2 %
MAN DIFF?: NORMAL
MCHC RBC-ENTMCNC: 25.9 PG
MCHC RBC-ENTMCNC: 31.6 GM/DL
MCV RBC AUTO: 82.2 FL
MONOCYTES # BLD AUTO: 0.16 K/UL
MONOCYTES NFR BLD AUTO: 4.4 %
N GONORRHOEA RRNA SPEC QL NAA+PROBE: NOT DETECTED
NEUTROPHILS # BLD AUTO: 0.85 K/UL
NEUTROPHILS NFR BLD AUTO: 23.9 %
PLATELET # BLD AUTO: 299 K/UL
RBC # BLD: 4.82 M/UL
RBC # FLD: 16.1 %
SOURCE AMPLIFICATION: NORMAL
WBC # FLD AUTO: 3.57 K/UL

## 2022-11-28 PROCEDURE — 36415 COLL VENOUS BLD VENIPUNCTURE: CPT

## 2022-11-28 PROCEDURE — 99213 OFFICE O/P EST LOW 20 MIN: CPT

## 2022-11-28 NOTE — REASON FOR VISIT
[Follow-Up] : a follow-up evaluation of [Other: _____] : [unfilled] [FreeTextEntry2] : f/u miscarriage from 11/25 visit

## 2022-11-28 NOTE — PLAN
[FreeTextEntry1] : f/u mab\par -sono from  noted blood inside uterus and cervical canal " in progress"\par -pr reports improvement in bleeding an pain\par -repeat bhcg done today\par -erx for repeat sono this week to evaluate need for D&C procedure\par -pt is being scheduled for D&C (discussed can cancel pending sono)\par -bleeding/infection precautions reviewed with pt. \par \par will f/u bhcg and sono results\par advised trending bhcg levels down-pt has appt for  for blood draw\par \par During this visit 20 minutes was spent face-to-face with greater than 50% of the time dedicated to counseling.\par \par

## 2022-11-28 NOTE — HISTORY OF PRESENT ILLNESS
[FreeTextEntry1] : 41 y/o   presents for follow up visit from  s/p miscarriage. \par  ER visits on  and  @ [facility] for pelvic pain and abnormal vaginal bleeding. Patient was told in the ER she was most likely experiencing a miscarriage and "needed a D&C". \par \par She feels better today and is experiencing less pain and lighter bleeding. Said she passed a large clot yesterday. \par \par PRIOR visit notes from :\par LMP 2022; planned and desired pregnancy. \par Started spotting and had mild cramping on  and went to the ER on  for "heavy bleeding and worse pain". seen again for pain and bleeding on . Was advised to follow up with GYN. \par Reports lighter bleeding today with lower pelvic cramping relieved by otc Naproxen use. \par \par No N/V/D, fever, chills, urinary symptoms, HA/dizziness, palpitations, weakness. \par \par From ER recs: 22; o pos blood type, bhcg 1583, H/H ; sono, normal w/o IUP. \par 22: bhcg 323, H/H , sono noted blood inside uterus and possible tissue. \par \par GYN: menarche age 11, irregular menses, menorrhagia, dysmenorrhea, adenomyosis. Last pap 10/12/21 denies hx of abnormal pap/hpv; mammogram 2020.\par OB: mab x2 (10/3/2020, current); c/sx3 (98, 06, 5/3/15) \par MEDHX: Diabetes, Asthma\par SURGHX:  x3\par FAMHX: mother (DMHTN/stroke)\par MEDS: Ozempic 1mg weekly, Flexiba flextouch (insulin degludec) *stopped meds and taking NPH per endo with + pregnancy test. OTC Tylenol PRN for pain, Naproxen. \par ALL: MORPHINE (hives)\par Social: , employed, non-smoker, denies etoh/drug use.\par Accepts blood products. \par

## 2022-11-29 ENCOUNTER — NON-APPOINTMENT (OUTPATIENT)
Age: 42
End: 2022-11-29

## 2022-11-29 LAB — HCG SERPL-MCNC: 62 MIU/ML

## 2022-11-30 ENCOUNTER — NON-APPOINTMENT (OUTPATIENT)
Age: 42
End: 2022-11-30

## 2022-11-30 ENCOUNTER — OUTPATIENT (OUTPATIENT)
Dept: OUTPATIENT SERVICES | Facility: HOSPITAL | Age: 42
LOS: 1 days | End: 2022-11-30
Payer: COMMERCIAL

## 2022-11-30 VITALS
WEIGHT: 203.93 LBS | HEART RATE: 82 BPM | TEMPERATURE: 99 F | SYSTOLIC BLOOD PRESSURE: 106 MMHG | OXYGEN SATURATION: 99 % | DIASTOLIC BLOOD PRESSURE: 76 MMHG | HEIGHT: 66 IN | RESPIRATION RATE: 16 BRPM

## 2022-11-30 DIAGNOSIS — Z98.890 OTHER SPECIFIED POSTPROCEDURAL STATES: Chronic | ICD-10-CM

## 2022-11-30 DIAGNOSIS — E11.9 TYPE 2 DIABETES MELLITUS WITHOUT COMPLICATIONS: ICD-10-CM

## 2022-11-30 DIAGNOSIS — O03.4 INCOMPLETE SPONTANEOUS ABORTION WITHOUT COMPLICATION: ICD-10-CM

## 2022-11-30 DIAGNOSIS — Z11.52 ENCOUNTER FOR SCREENING FOR COVID-19: ICD-10-CM

## 2022-11-30 DIAGNOSIS — O02.1 MISSED ABORTION: ICD-10-CM

## 2022-11-30 DIAGNOSIS — Z98.891 HISTORY OF UTERINE SCAR FROM PREVIOUS SURGERY: Chronic | ICD-10-CM

## 2022-11-30 DIAGNOSIS — Z01.818 ENCOUNTER FOR OTHER PREPROCEDURAL EXAMINATION: ICD-10-CM

## 2022-11-30 LAB
A1C WITH ESTIMATED AVERAGE GLUCOSE RESULT: 7.2 % — HIGH (ref 4–5.6)
ANION GAP SERPL CALC-SCNC: 9 MMOL/L — SIGNIFICANT CHANGE UP (ref 5–17)
BLD GP AB SCN SERPL QL: NEGATIVE — SIGNIFICANT CHANGE UP
BUN SERPL-MCNC: 10 MG/DL — SIGNIFICANT CHANGE UP (ref 7–23)
CALCIUM SERPL-MCNC: 9.4 MG/DL — SIGNIFICANT CHANGE UP (ref 8.4–10.5)
CHLORIDE SERPL-SCNC: 102 MMOL/L — SIGNIFICANT CHANGE UP (ref 96–108)
CO2 SERPL-SCNC: 25 MMOL/L — SIGNIFICANT CHANGE UP (ref 22–31)
CREAT SERPL-MCNC: 0.45 MG/DL — LOW (ref 0.5–1.3)
EGFR: 122 ML/MIN/1.73M2 — SIGNIFICANT CHANGE UP
ESTIMATED AVERAGE GLUCOSE: 160 MG/DL — HIGH (ref 68–114)
GLUCOSE SERPL-MCNC: 94 MG/DL — SIGNIFICANT CHANGE UP (ref 70–99)
HCT VFR BLD CALC: 36.2 % — SIGNIFICANT CHANGE UP (ref 34.5–45)
HGB BLD-MCNC: 11.3 G/DL — LOW (ref 11.5–15.5)
MCHC RBC-ENTMCNC: 25.5 PG — LOW (ref 27–34)
MCHC RBC-ENTMCNC: 31.2 GM/DL — LOW (ref 32–36)
MCV RBC AUTO: 81.5 FL — SIGNIFICANT CHANGE UP (ref 80–100)
NRBC # BLD: 0 /100 WBCS — SIGNIFICANT CHANGE UP (ref 0–0)
PLATELET # BLD AUTO: 323 K/UL — SIGNIFICANT CHANGE UP (ref 150–400)
POTASSIUM SERPL-MCNC: 3.6 MMOL/L — SIGNIFICANT CHANGE UP (ref 3.5–5.3)
POTASSIUM SERPL-SCNC: 3.6 MMOL/L — SIGNIFICANT CHANGE UP (ref 3.5–5.3)
RBC # BLD: 4.44 M/UL — SIGNIFICANT CHANGE UP (ref 3.8–5.2)
RBC # FLD: 15.6 % — HIGH (ref 10.3–14.5)
RH IG SCN BLD-IMP: POSITIVE — SIGNIFICANT CHANGE UP
SARS-COV-2 RNA SPEC QL NAA+PROBE: SIGNIFICANT CHANGE UP
SODIUM SERPL-SCNC: 136 MMOL/L — SIGNIFICANT CHANGE UP (ref 135–145)
WBC # BLD: 6.15 K/UL — SIGNIFICANT CHANGE UP (ref 3.8–10.5)
WBC # FLD AUTO: 6.15 K/UL — SIGNIFICANT CHANGE UP (ref 3.8–10.5)

## 2022-11-30 PROCEDURE — 86900 BLOOD TYPING SEROLOGIC ABO: CPT

## 2022-11-30 PROCEDURE — U0005: CPT

## 2022-11-30 PROCEDURE — U0003: CPT

## 2022-11-30 PROCEDURE — 80048 BASIC METABOLIC PNL TOTAL CA: CPT

## 2022-11-30 PROCEDURE — G0463: CPT

## 2022-11-30 PROCEDURE — 85027 COMPLETE CBC AUTOMATED: CPT

## 2022-11-30 PROCEDURE — 86901 BLOOD TYPING SEROLOGIC RH(D): CPT

## 2022-11-30 PROCEDURE — 83036 HEMOGLOBIN GLYCOSYLATED A1C: CPT

## 2022-11-30 PROCEDURE — C9803: CPT

## 2022-11-30 PROCEDURE — 86850 RBC ANTIBODY SCREEN: CPT

## 2022-11-30 RX ORDER — GLIMEPIRIDE 1 MG
0 TABLET ORAL
Qty: 0 | Refills: 0 | DISCHARGE

## 2022-11-30 RX ORDER — INSULIN DETEMIR 100/ML (3)
0 INSULIN PEN (ML) SUBCUTANEOUS
Qty: 0 | Refills: 0 | DISCHARGE

## 2022-11-30 NOTE — H&P PST ADULT - FALL HARM RISK - CONCLUSION
[address] [place] [address]                               DISCHARGE SUMMARY    PATIENT NAME: Nathalia Mejia                    :        1950  MED REC NO:   [id number]                           ROOM:       [id number]  ACCOUNT NO:   [de-identified]                           ADMIT DATE: 2022  PROVIDER:     [facility]. Jessika Fuentes M.D.            Aleena Heck: 07/10/2022    DISCHARGE FINAL DIAGNOSES:  1. Acute colitis. 2.  Diarrhea. 3.  Hypokalemia. 4.  Dehydration. 5.  Hypertension. 6.  GERD. 7.  Hypothyroidism. 8.  Vitamin B12 deficiency. 9.  History of Jaja-en-Y gastric bypass. DISCHARGE HOME MEDICATIONS:  Includin. Cipro 500 mg twice a day for five days. 2.  Flagyl 500 mg three times a day for five days. 3.  Bentyl 10 mg three times a day for pain p.r.n.  4.  BuSpar 10 mg one tablet daily. 5.  Lisinopril 10 mg one tablet daily. 6.  Levothyroxine 25 mg daily. 7.  Multivitamins one tablet daily. 8.  Sertraline 50 mg one tablet daily. 9.  Furosemide 1-1/2 tablets once a day as needed for water retention. 10.  Vitamin balance B12 one tablet daily. ACTIVITY:  As tolerated. DIET:  Will be as tolerated. FOLLOWUP:  With Dr. Kiley Chu in the next five to seven days. CONSULTANTS DURING THIS HOSPITALIZATION:  Dr. Jerome Aragon from  Gastroenterology. HISTORY AND PHYSICAL:  The patient is a 69-year-old female with a known  history of hypertension, hypothyroidism, presents with increasing  abdominal pain with bloody diarrhea over the past 36 hours. The patient  states that on the day of 2022, she started to have generalized  diffuse abdominal discomfort and then started to have increasing amount  of loose stool. The patient did not have any vomiting. Stools are  frequent every 10 minutes, mucousy.   The patient went to the emergency  room and noted to have diffuse colitis involving the colon and thickness  of Universal Safety Interventions

## 2022-11-30 NOTE — H&P PST ADULT - BLOOD TRANSFUSION, PREVIOUS, PROFILE
Patient cancelled appointment on 5/2/19 with  for follow up SOB. Reason: Transportation issue   Patient did  reschedule appointment. Appointment rescheduled for  5/28/19.     No Show 7/10/18 and 4/4/18    ASSESSMENT: 8/22/17  · SOB - she is at risk for COPD  · Cough probably from post-nasal gtt and COPD  · Post-nasal gtt  · Rhinitis  · Tobacco abuse  ·       PLAN:   · PFT/6MWT  · CXR today  · Sample of incruse until next visit  · Further medication adjustment after PFT results  · Continue claritin  · Start flonase  · Tobacco cessation  · pvx 23 and flu vaccine today  · F/u for results   
no

## 2022-11-30 NOTE — H&P PST ADULT - HISTORY OF PRESENT ILLNESS
43 yo F    presenting @ 9 weeks of gestation c/o pervaginal bleeding & abdominal cramps  since 22-had ED admission/f/u GYN evaluation- s/p pelvic sonogram revealed absence of FHR- missed - scheduled for D&C for missed   on 22  **Denies any fever, chills, N/V or sick contacts   **Covid 19 PCR on 22

## 2022-11-30 NOTE — H&P PST ADULT - NSICDXPASTSURGICALHX_GEN_ALL_CORE_FT
PAST SURGICAL HISTORY:  History of  x3    History of D&C missed  10/2020    S/P arthroscopy of left shoulder     S/P excision of lipoma left upper back-

## 2022-11-30 NOTE — H&P PST ADULT - PROBLEM SELECTOR PLAN 1
"""Informed patient that their cataract is visually significant and meets the criteria for cataract "" D&C for missed Ab  Labs- CBC, BMP, Hb A1C, T&S  Pre op instructions discussed

## 2022-11-30 NOTE — H&P PST ADULT - FALL HARM RISK - UNIVERSAL INTERVENTIONS
Bed in lowest position, wheels locked, appropriate side rails in place/Call bell, personal items and telephone in reach/Instruct patient to call for assistance before getting out of bed or chair/Non-slip footwear when patient is out of bed/[place] to call system/Purposeful Proactive Rounding/Room/bathroom lighting operational, light cord in reach

## 2022-12-01 ENCOUNTER — APPOINTMENT (OUTPATIENT)
Dept: OBGYN | Facility: CLINIC | Age: 42
End: 2022-12-01

## 2022-12-01 ENCOUNTER — ASOB RESULT (OUTPATIENT)
Age: 42
End: 2022-12-01

## 2022-12-01 PROBLEM — E11.9 TYPE 2 DIABETES MELLITUS WITHOUT COMPLICATIONS: Chronic | Status: ACTIVE | Noted: 2022-11-30

## 2022-12-01 PROBLEM — E66.9 OBESITY, UNSPECIFIED: Chronic | Status: ACTIVE | Noted: 2022-11-30

## 2022-12-01 PROCEDURE — 76830 TRANSVAGINAL US NON-OB: CPT

## 2022-12-05 ENCOUNTER — APPOINTMENT (OUTPATIENT)
Dept: OBGYN | Facility: CLINIC | Age: 42
End: 2022-12-05

## 2022-12-06 ENCOUNTER — ASOB RESULT (OUTPATIENT)
Age: 42
End: 2022-12-06

## 2022-12-06 ENCOUNTER — APPOINTMENT (OUTPATIENT)
Dept: OBGYN | Facility: CLINIC | Age: 42
End: 2022-12-06

## 2022-12-06 VITALS
DIASTOLIC BLOOD PRESSURE: 80 MMHG | HEIGHT: 66 IN | WEIGHT: 205 LBS | SYSTOLIC BLOOD PRESSURE: 118 MMHG | BODY MASS INDEX: 32.95 KG/M2

## 2022-12-06 DIAGNOSIS — G89.29 PELVIC AND PERINEAL PAIN: ICD-10-CM

## 2022-12-06 DIAGNOSIS — Z87.59 PERSONAL HISTORY OF OTHER COMPLICATIONS OF PREGNANCY, CHILDBIRTH AND THE PUERPERIUM: ICD-10-CM

## 2022-12-06 DIAGNOSIS — R10.2 PELVIC AND PERINEAL PAIN: ICD-10-CM

## 2022-12-06 DIAGNOSIS — N89.8 OTHER SPECIFIED NONINFLAMMATORY DISORDERS OF VAGINA: ICD-10-CM

## 2022-12-06 PROCEDURE — 99214 OFFICE O/P EST MOD 30 MIN: CPT

## 2022-12-06 PROCEDURE — 76856 US EXAM PELVIC COMPLETE: CPT

## 2022-12-06 PROCEDURE — 36415 COLL VENOUS BLD VENIPUNCTURE: CPT

## 2022-12-06 NOTE — REVIEW OF SYSTEMS
[Patient Intake Form Reviewed] : Patient intake form was reviewed [Genital Rash/Irritation] : genital rash/irritation [Negative] : Heme/Lymph [FreeTextEntry8] : discharge and odor

## 2022-12-06 NOTE — HISTORY OF PRESENT ILLNESS
[FreeTextEntry1] : 43 y/o   presents for follow up visit from  s/p miscarriage. \par  ER visits on  and  @ [facility] for pelvic pain and abnormal vaginal bleeding. Patient was told in the ER she was most likely experiencing a miscarriage and "needed a D&C". \par \par She feels better today and is experiencing less pain and lighter bleeding. Does report new symptoms of yellow vaginal discharge and fishy odor x2 days.  \par \par last bhcg 22 62; sono from  pocs noted in uterus, was sced for D&C procedure on . D/w Dr. Phelan at that time-needed another sono' d7c was cancelled; sono today shows no poc /tissue inside uterus, normal flow to endo and ovaries. pt denies pelvic pain, only having light spotting. \par \par GYN: menarche age 11, irregular menses, menorrhagia, dysmenorrhea, adenomyosis. Last pap 10/12/21 denies hx of abnormal pap/hpv; mammogram 2020.\par OB: mab x2 (10/3/2020, current); c/sx3 (98, 06, 5/3/15) \par MEDHX: Diabetes, Asthma\par SURGHX:  x3\par FAMHX: mother (DMHTN/stroke)\par MEDS: Ozempic 1mg weekly, Flexiba flextouch (insulin degludec) *stopped meds and taking NPH per endo with + pregnancy test. OTC Tylenol PRN for pain, Naproxen. \par ALL: MORPHINE (hives)\par Social: , employed, non-smoker, denies etoh/drug use.\par Accepts blood products. \par

## 2022-12-06 NOTE — REASON FOR VISIT
[Follow-Up] : a follow-up evaluation of [Vulvar/Vaginal Complaint] : vulvar/vaginal complaint [Other: _____] : [unfilled] [FreeTextEntry2] : f/u miscarriage

## 2022-12-06 NOTE — COUNSELING
[Nutrition/ Exercise/ Weight Management] : nutrition, exercise, weight management [Vitamins/Supplements] : vitamins/supplements [Contraception/ Emergency Contraception/ Safe Sexual Practices] : contraception, emergency contraception, safe sexual practices [Confidentiality] : confidentiality [STD (testing, results, tx)] : STD (testing, results, tx) [Lab Results] : lab results [Medication Management] : medication management

## 2022-12-06 NOTE — PLAN
[FreeTextEntry1] : f/u mab\par -normal sono today\par -bhcg today\par -bd affirm done for d/c\par -erx pelvic sono for f/u right ovarian cyst after next menses\par -has preconception consult planned with [facility] as previously advised for RPL\par -advised to make appt for annual GYN visit \par \par During this visit 25 minutes were spent face-to-face with greater than 50% of the time dedicated to counseling.\par

## 2022-12-06 NOTE — PHYSICAL EXAM
[Chaperone Declined] : Patient declined chaperone [Appropriately responsive] : appropriately responsive [Alert] : alert [No Acute Distress] : no acute distress [No Lymphadenopathy] : no lymphadenopathy [Soft] : soft [Non-tender] : non-tender [Non-distended] : non-distended [No HSM] : No HSM [No Lesions] : no lesions [No Mass] : no mass [Oriented x3] : oriented x3 [Labia Majora] : normal [Labia Minora] : normal [Discharge] : a  ~M vaginal discharge was present [Scant] : scant [Foul Smelling] : foul smelling [Gabby] : yellow [Thin] : thin [Normal] : normal [Uterine Adnexae] : normal

## 2022-12-07 LAB
CANDIDA VAG CYTO: NOT DETECTED
G VAGINALIS+PREV SP MTYP VAG QL MICRO: DETECTED
HCG SERPL-MCNC: 6 MIU/ML
T VAGINALIS VAG QL WET PREP: NOT DETECTED

## 2022-12-12 ENCOUNTER — APPOINTMENT (OUTPATIENT)
Dept: OBGYN | Facility: CLINIC | Age: 42
End: 2022-12-12

## 2022-12-12 ENCOUNTER — TRANSCRIPTION ENCOUNTER (OUTPATIENT)
Age: 42
End: 2022-12-12

## 2022-12-12 DIAGNOSIS — Z87.59 PERSONAL HISTORY OF OTHER COMPLICATIONS OF PREGNANCY, CHILDBIRTH AND THE PUERPERIUM: ICD-10-CM

## 2022-12-12 PROCEDURE — 36415 COLL VENOUS BLD VENIPUNCTURE: CPT

## 2022-12-13 LAB — HCG SERPL-MCNC: 2 MIU/ML

## 2023-01-10 ENCOUNTER — APPOINTMENT (OUTPATIENT)
Dept: ULTRASOUND IMAGING | Facility: IMAGING CENTER | Age: 43
End: 2023-01-10
Payer: COMMERCIAL

## 2023-01-10 ENCOUNTER — OUTPATIENT (OUTPATIENT)
Dept: OUTPATIENT SERVICES | Facility: HOSPITAL | Age: 43
LOS: 1 days | End: 2023-01-10
Payer: COMMERCIAL

## 2023-01-10 DIAGNOSIS — Z98.890 OTHER SPECIFIED POSTPROCEDURAL STATES: Chronic | ICD-10-CM

## 2023-01-10 DIAGNOSIS — Z98.891 HISTORY OF UTERINE SCAR FROM PREVIOUS SURGERY: Chronic | ICD-10-CM

## 2023-01-10 DIAGNOSIS — R10.2 PELVIC AND PERINEAL PAIN: ICD-10-CM

## 2023-01-10 PROCEDURE — 76856 US EXAM PELVIC COMPLETE: CPT

## 2023-01-10 PROCEDURE — 76856 US EXAM PELVIC COMPLETE: CPT | Mod: 26,59

## 2023-01-10 PROCEDURE — 76830 TRANSVAGINAL US NON-OB: CPT | Mod: 26

## 2023-01-10 PROCEDURE — 76830 TRANSVAGINAL US NON-OB: CPT

## 2023-01-18 ENCOUNTER — APPOINTMENT (OUTPATIENT)
Dept: OBGYN | Facility: CLINIC | Age: 43
End: 2023-01-18
Payer: COMMERCIAL

## 2023-01-18 VITALS
WEIGHT: 207 LBS | SYSTOLIC BLOOD PRESSURE: 125 MMHG | DIASTOLIC BLOOD PRESSURE: 86 MMHG | HEIGHT: 66 IN | BODY MASS INDEX: 33.27 KG/M2

## 2023-01-18 DIAGNOSIS — Z00.00 ENCOUNTER FOR GENERAL ADULT MEDICAL EXAMINATION W/OUT ABNORMAL FINDINGS: ICD-10-CM

## 2023-01-18 DIAGNOSIS — Z31.69 ENCOUNTER FOR OTHER GENERAL COUNSELING AND ADVICE ON PROCREATION: ICD-10-CM

## 2023-01-18 PROCEDURE — 36415 COLL VENOUS BLD VENIPUNCTURE: CPT

## 2023-01-18 PROCEDURE — 99214 OFFICE O/P EST MOD 30 MIN: CPT

## 2023-01-18 RX ORDER — METRONIDAZOLE 7.5 MG/G
0.75 GEL VAGINAL
Qty: 1 | Refills: 0 | Status: DISCONTINUED | COMMUNITY
Start: 2022-12-07 | End: 2023-01-18

## 2023-01-18 NOTE — END OF VISIT
[FreeTextEntry3] : I Uche Zheng, acted as a scribe on behalf of Dr. Maira Concepcion on 01/18/2023.\par \par All medical entries made by this scribe where at my Dr. Maira Concepcion, direction and personally dictated by me on 01/18/2023.  I have reviewed the chart and agree that the record accurately reflects my personal performance of the history, physical exam, assessment, and plan. I have also personally directed, reviewed and agreed with the chart.

## 2023-01-18 NOTE — HISTORY OF PRESENT ILLNESS
[FreeTextEntry1] : 43 yo  presents for a preconception. consultation. LMP 2023.\par had miscarraige in  and . strongly desires another pregnancy. only has one child with current partner. \par \par GYN: menarche age 11, Pt reports that her periods are usually 28 days, which last 5-6 days.    Last pap 10/12/21 denies hx of abnormal pap/hpv; mammogram 2020.\par \par OB:\par -First pregnancy - (with previous partner) CD on 1998, Boy 6lb 14oz (Born in [place]) \par -2nd pregnancy (with previous partner) CD on 2006, Boy 9+lbs, Pt reports that she had Diabetes during pregnancy - was not treated. She also reports that she had high blood pressure during pregnancy. (Born in [place])\par -3rd pregnancy (with current partner)  on 5/3/2015, Girl, 6lbs 14oz, Gestational diabetes - on Insulin. (Born in [place]) \par - Miscarriage (in [place]) - Pt reports that she had D&C done at [facility] at 8 weeks, no heart beat was noted. She reports that she did not have genetic testing done on POC. \par -2022 Miscarriage between 9-10 weeks based on dates. LMP was in September. Pt reports that she did not have a sono until  she went to the ER with bleeding. no IUP seen. (therefore unclear timing of when miscarriage occurred)\par  \par PMH: Diabetes - ( Diagnosed on ) is on Basaglar 60 at night (although currently holding dose given normal fasting glucose) and NovoLog 8 units (with meals, as needed). (previously on tresiba and ozempic but stopped with last pregnancy). Last HbA1c was 7.3 in 10/2022 (no records available for review). Pt sees Endocrinologist Dr. Alejandro. no recent ophtho visit. no prior podiatry visits. no renal abnl per pt. no recent cardiology eval. \par childhood Asthma - not on any medications- did have some worsening sx during 2015 pregnancy\par PSH:  x3;  Shoulder ligament repair; Cyst removal on back\par FH: Mother (DMHTN/stroke); Paternal Grandmother - Diabetes\par MEDS:  Basaglar 60 wHS and NovoLog 8 units w meals ( as needed), OTC Tylenol PRN for pain, Naproxen. 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 no t/a/d.  smokes tobacco\par \par Immunizations:\par -s/p COVID vaccine and booster\par -Pt does not have her flu vaccine\par \par no prior expanded carrier screening  \par Accept blood transfusions. \par No prior VTE

## 2023-01-18 NOTE — PLAN
[FreeTextEntry1] : 43 yo  presents for a preconception consultation \par \par SAB x2\par -suspect aneuploidy related given maternal age and timeline of miscarriage.. however, no POC testing in either miscarriage\par -does not meet criteria for APAS\par -do not recommend APAS testing at this time\par \par Patient counseled about age-related risks (>39yo) and pregnancy\par -decreased fecundity and increased risk of aneuploidy with spontaneous conception. recommend FELICITA eval to assess ovarian reserve \par -high risk of miscarriage\par -age may limit ability to conceive spontaneously (And with own eggs)\par -reviewed options for aneuploidy screening vs diagnostic testing.   option for screening in pregnancy with NT sono, MSAFP and NIPS. Reviewed limitations of this testing. Discussed option for diagnostics testing with CVS/amniocentesis. Discussion r/b/a.\par -If IVF, option for PGT-a (raymundo if own eggs, would recommend this prior to transfer if able to afford this testing). If IVF with donor egg, then risks related to age of donor.\par discussed level 2 anatomy ultrasound. Recommend fetal echo with IVF pregnancy\par  \par  \par reviewed fetal risks for ama (>39yo)\par -risk of growth restriction \par -increased risk for stillbirth-plan close ultrasound surveillance, serial growths and  fetal testing in the late third trimester, and delivery by jenn\par -fetal echo in s/o IVF\par  \par reviewed maternal risks of pregnancy for AMA(>39yo) \par -specifically risks of PEC/HTN d/o, cardiovascular complications\par -reviewed use of aspirin for PEC risk reduction\par -reviewed plan for close monitoring of BPs\par  \par Diabetes\par -Reviewed in detail the importance of tight blood sugar control leading up to and during preganncy\par -reviewed risk of fetal malformation and inc risk of miscarraige with uncontrolled DM\par -agree insulin regimen is ideal tx for diabetes in pregnancy (avoid tresiba and ozempic in pregnancy)\par -recommend following with DIPP when +pregnancy\par -Advised to see Podiatrist, Cardiologist, Ophthalmologist \par -Inc'ed pregnancy surveillance. anatomy sono. fetal echo. serial growth ultrasounds. fetal testing.\par -bASA for PEC risk reduciton\par -HbA1c1 drawn today \par -TSH today\par \par PRior CD x3\par -for repeat CD in subsequent pregnancy\par -inc risk for PAS (placenta-acreta spectrum) disorders. follow up placentation\par \par Asthma\par -monitor sx\par -referral to pulm, if sx worsen\par \par General Advice\par Expanded carrier screening today\par Discussed vaccinations. Recommend flu vaccine and covid booster\par Cont good diet and exercise\par Pnv w additional folic acid prior to conception\par \par Referral to FELICITA provided today\par \par \par \par

## 2023-01-19 ENCOUNTER — NON-APPOINTMENT (OUTPATIENT)
Age: 43
End: 2023-01-19

## 2023-01-19 ENCOUNTER — APPOINTMENT (OUTPATIENT)
Dept: OBGYN | Facility: CLINIC | Age: 43
End: 2023-01-19
Payer: COMMERCIAL

## 2023-01-19 VITALS
HEART RATE: 93 BPM | DIASTOLIC BLOOD PRESSURE: 82 MMHG | OXYGEN SATURATION: 99 % | BODY MASS INDEX: 33.91 KG/M2 | HEIGHT: 66 IN | SYSTOLIC BLOOD PRESSURE: 129 MMHG | RESPIRATION RATE: 16 BRPM | WEIGHT: 211 LBS

## 2023-01-19 DIAGNOSIS — Z11.51 ENCOUNTER FOR SCREENING FOR HUMAN PAPILLOMAVIRUS (HPV): ICD-10-CM

## 2023-01-19 DIAGNOSIS — Z01.419 ENCOUNTER FOR GYNECOLOGICAL EXAMINATION (GENERAL) (ROUTINE) W/OUT ABNORMAL FINDINGS: ICD-10-CM

## 2023-01-19 LAB
ESTIMATED AVERAGE GLUCOSE: 131 MG/DL
HBA1C MFR BLD HPLC: 6.2 %
TSH SERPL-ACNC: 1.12 UIU/ML

## 2023-01-19 PROCEDURE — 99396 PREV VISIT EST AGE 40-64: CPT

## 2023-01-19 NOTE — END OF VISIT
[FreeTextEntry3] : I, Charlie Sequeira, acted as a scribe on behalf of Dr. Codi Phelan on 01/19/2023 .\par \par All medical entries made by the scribe were at my, Dr. Codi Phelan's, direction and personally dictated by me on 01/19/2023. I have reviewed the chart and agree that the record accurately reflects my personal performance of the history, physical exam, assessment and plan. I have also personally directed, reviewed, and agreed with the chart.

## 2023-01-19 NOTE — PLAN
[FreeTextEntry1] : 41 yo for annual, stable.\par \par -pap today\par -rx mammo given\par \par rto 1 yr

## 2023-01-19 NOTE — HISTORY OF PRESENT ILLNESS
[FreeTextEntry1] : 41 yo  LMP 23 presents for annual, She is doing well and has no complaints.\par \par OB:\par 1998 CD boy 6lb 14oz, with previous partner, delivered in [place]\par 2006 CD boy 9+lbs, gDM and gHTN, with previous partner, delivered in [place]\par 5/3/2015 C/S girl 6lbs 14oz, gDM on insulin, with current partner, delivered in [place]\par  MAB/D&C @8wk, no FHR, no genetic testing\par 2022 SAB @9-10wks based on LMP dating, only sono at ER for bleeding with no IUP seen, unclear timing of miscarriage\par \par GYN:\par Medical: DM (on Basaglar 60 and NovoLog 8 units), childhood asthma\par Surgical: C/S x3, shoulder ligment repair, cyst removal from back\par Family: Mother- DM, HTN, stroke; PGM- DM\par Meds: Basaglar 60, NovoLog 8\par Allergies: Morphine (hives) [Mammogramdate] : 11/20 [PapSmeardate] : 10/21

## 2023-01-23 ENCOUNTER — TRANSCRIPTION ENCOUNTER (OUTPATIENT)
Age: 43
End: 2023-01-23

## 2023-01-23 LAB
CYTOLOGY CVX/VAG DOC THIN PREP: ABNORMAL
HPV HIGH+LOW RISK DNA PNL CVX: NOT DETECTED

## 2023-02-10 ENCOUNTER — APPOINTMENT (OUTPATIENT)
Dept: HUMAN REPRODUCTION | Facility: CLINIC | Age: 43
End: 2023-02-10
Payer: COMMERCIAL

## 2023-02-10 PROCEDURE — 36415 COLL VENOUS BLD VENIPUNCTURE: CPT

## 2023-02-10 PROCEDURE — 99215 OFFICE O/P EST HI 40 MIN: CPT | Mod: 25

## 2023-02-10 PROCEDURE — 76830 TRANSVAGINAL US NON-OB: CPT

## 2023-02-10 PROCEDURE — 99205 OFFICE O/P NEW HI 60 MIN: CPT | Mod: 25

## 2023-03-06 ENCOUNTER — APPOINTMENT (OUTPATIENT)
Dept: HUMAN REPRODUCTION | Facility: CLINIC | Age: 43
End: 2023-03-06
Payer: COMMERCIAL

## 2023-03-06 PROCEDURE — 36415 COLL VENOUS BLD VENIPUNCTURE: CPT

## 2023-03-13 ENCOUNTER — APPOINTMENT (OUTPATIENT)
Dept: HUMAN REPRODUCTION | Facility: CLINIC | Age: 43
End: 2023-03-13
Payer: COMMERCIAL

## 2023-03-13 PROCEDURE — 74740 X-RAY FEMALE GENITAL TRACT: CPT

## 2023-03-13 PROCEDURE — 58999I: CUSTOM

## 2023-03-13 PROCEDURE — 76831 ECHO EXAM UTERUS: CPT

## 2023-03-13 PROCEDURE — 58340 CATHETER FOR HYSTEROGRAPHY: CPT

## 2023-03-17 ENCOUNTER — APPOINTMENT (OUTPATIENT)
Dept: HUMAN REPRODUCTION | Facility: CLINIC | Age: 43
End: 2023-03-17
Payer: COMMERCIAL

## 2023-03-17 PROCEDURE — 99215 OFFICE O/P EST HI 40 MIN: CPT | Mod: 95

## 2023-03-23 ENCOUNTER — EMERGENCY (EMERGENCY)
Facility: HOSPITAL | Age: 43
LOS: 0 days | Discharge: HOME | End: 2023-03-24
Attending: EMERGENCY MEDICINE
Payer: COMMERCIAL

## 2023-03-23 VITALS
RESPIRATION RATE: 18 BRPM | TEMPERATURE: 98 F | SYSTOLIC BLOOD PRESSURE: 114 MMHG | HEIGHT: 66 IN | HEART RATE: 94 BPM | DIASTOLIC BLOOD PRESSURE: 84 MMHG | WEIGHT: 210.1 LBS | OXYGEN SATURATION: 99 %

## 2023-03-23 DIAGNOSIS — K92.0 HEMATEMESIS: ICD-10-CM

## 2023-03-23 DIAGNOSIS — Z79.4 LONG TERM (CURRENT) USE OF INSULIN: ICD-10-CM

## 2023-03-23 DIAGNOSIS — Z98.890 OTHER SPECIFIED POSTPROCEDURAL STATES: Chronic | ICD-10-CM

## 2023-03-23 DIAGNOSIS — Z88.5 ALLERGY STATUS TO NARCOTIC AGENT: ICD-10-CM

## 2023-03-23 DIAGNOSIS — Z88.0 ALLERGY STATUS TO PENICILLIN: ICD-10-CM

## 2023-03-23 DIAGNOSIS — Z98.891 HISTORY OF UTERINE SCAR FROM PREVIOUS SURGERY: Chronic | ICD-10-CM

## 2023-03-23 DIAGNOSIS — R10.13 EPIGASTRIC PAIN: ICD-10-CM

## 2023-03-23 DIAGNOSIS — K29.70 GASTRITIS, UNSPECIFIED, WITHOUT BLEEDING: ICD-10-CM

## 2023-03-23 DIAGNOSIS — E11.9 TYPE 2 DIABETES MELLITUS WITHOUT COMPLICATIONS: ICD-10-CM

## 2023-03-23 PROCEDURE — 93010 ELECTROCARDIOGRAM REPORT: CPT

## 2023-03-23 PROCEDURE — 99285 EMERGENCY DEPT VISIT HI MDM: CPT

## 2023-03-23 NOTE — ED ADULT TRIAGE NOTE - CHIEF COMPLAINT QUOTE
PMH of DM  C/o nausea, vomiting, blood in the emesis since this morning. Pt endorses 3 episode of vomiting, last episode ~2100 with bright red blood. Pt endorses burning sensation in the epigastric region. Denies sob, speaks full complete sentences.

## 2023-03-24 VITALS
OXYGEN SATURATION: 98 % | RESPIRATION RATE: 17 BRPM | TEMPERATURE: 98 F | SYSTOLIC BLOOD PRESSURE: 106 MMHG | DIASTOLIC BLOOD PRESSURE: 61 MMHG | HEART RATE: 79 BPM

## 2023-03-24 LAB
ALBUMIN SERPL ELPH-MCNC: 3.1 G/DL — LOW (ref 3.3–5)
ALP SERPL-CCNC: 95 U/L — SIGNIFICANT CHANGE UP (ref 40–120)
ALT FLD-CCNC: 28 U/L — SIGNIFICANT CHANGE UP (ref 12–78)
AMYLASE P1 CFR SERPL: 51 U/L — SIGNIFICANT CHANGE UP (ref 25–115)
ANION GAP SERPL CALC-SCNC: 4 MMOL/L — LOW (ref 5–17)
APTT BLD: 28.7 SEC — SIGNIFICANT CHANGE UP (ref 27.5–35.5)
AST SERPL-CCNC: 12 U/L — LOW (ref 15–37)
BASOPHILS # BLD AUTO: 0.02 K/UL — SIGNIFICANT CHANGE UP (ref 0–0.2)
BASOPHILS NFR BLD AUTO: 0.2 % — SIGNIFICANT CHANGE UP (ref 0–2)
BILIRUB SERPL-MCNC: 0.2 MG/DL — SIGNIFICANT CHANGE UP (ref 0.2–1.2)
BLD GP AB SCN SERPL QL: SIGNIFICANT CHANGE UP
BUN SERPL-MCNC: 9 MG/DL — SIGNIFICANT CHANGE UP (ref 7–23)
CALCIUM SERPL-MCNC: 9.3 MG/DL — SIGNIFICANT CHANGE UP (ref 8.5–10.1)
CHLORIDE SERPL-SCNC: 104 MMOL/L — SIGNIFICANT CHANGE UP (ref 96–108)
CO2 SERPL-SCNC: 27 MMOL/L — SIGNIFICANT CHANGE UP (ref 22–31)
CREAT SERPL-MCNC: 0.54 MG/DL — SIGNIFICANT CHANGE UP (ref 0.5–1.3)
EGFR: 116 ML/MIN/1.73M2 — SIGNIFICANT CHANGE UP
EOSINOPHIL # BLD AUTO: 0.02 K/UL — SIGNIFICANT CHANGE UP (ref 0–0.5)
EOSINOPHIL NFR BLD AUTO: 0.2 % — SIGNIFICANT CHANGE UP (ref 0–6)
GLUCOSE SERPL-MCNC: 112 MG/DL — HIGH (ref 70–99)
HCT VFR BLD CALC: 36.8 % — SIGNIFICANT CHANGE UP (ref 34.5–45)
HGB BLD-MCNC: 11.9 G/DL — SIGNIFICANT CHANGE UP (ref 11.5–15.5)
IMM GRANULOCYTES NFR BLD AUTO: 0.5 % — SIGNIFICANT CHANGE UP (ref 0–0.9)
INR BLD: 1.14 RATIO — SIGNIFICANT CHANGE UP (ref 0.88–1.16)
LIDOCAIN IGE QN: 102 U/L — SIGNIFICANT CHANGE UP (ref 73–393)
LYMPHOCYTES # BLD AUTO: 3.09 K/UL — SIGNIFICANT CHANGE UP (ref 1–3.3)
LYMPHOCYTES # BLD AUTO: 36.8 % — SIGNIFICANT CHANGE UP (ref 13–44)
MCHC RBC-ENTMCNC: 26.1 PG — LOW (ref 27–34)
MCHC RBC-ENTMCNC: 32.3 G/DL — SIGNIFICANT CHANGE UP (ref 32–36)
MCV RBC AUTO: 80.7 FL — SIGNIFICANT CHANGE UP (ref 80–100)
MONOCYTES # BLD AUTO: 0.63 K/UL — SIGNIFICANT CHANGE UP (ref 0–0.9)
MONOCYTES NFR BLD AUTO: 7.5 % — SIGNIFICANT CHANGE UP (ref 2–14)
NEUTROPHILS # BLD AUTO: 4.59 K/UL — SIGNIFICANT CHANGE UP (ref 1.8–7.4)
NEUTROPHILS NFR BLD AUTO: 54.8 % — SIGNIFICANT CHANGE UP (ref 43–77)
NRBC # BLD: 0 /100 WBCS — SIGNIFICANT CHANGE UP (ref 0–0)
PLATELET # BLD AUTO: 307 K/UL — SIGNIFICANT CHANGE UP (ref 150–400)
POTASSIUM SERPL-MCNC: 3.6 MMOL/L — SIGNIFICANT CHANGE UP (ref 3.5–5.3)
POTASSIUM SERPL-SCNC: 3.6 MMOL/L — SIGNIFICANT CHANGE UP (ref 3.5–5.3)
PROT SERPL-MCNC: 7.4 GM/DL — SIGNIFICANT CHANGE UP (ref 6–8.3)
PROTHROM AB SERPL-ACNC: 13.7 SEC — HIGH (ref 10.5–13.4)
RBC # BLD: 4.56 M/UL — SIGNIFICANT CHANGE UP (ref 3.8–5.2)
RBC # FLD: 13.8 % — SIGNIFICANT CHANGE UP (ref 10.3–14.5)
SODIUM SERPL-SCNC: 135 MMOL/L — SIGNIFICANT CHANGE UP (ref 135–145)
TROPONIN I, HIGH SENSITIVITY RESULT: 3.3 NG/L — SIGNIFICANT CHANGE UP
WBC # BLD: 8.39 K/UL — SIGNIFICANT CHANGE UP (ref 3.8–10.5)
WBC # FLD AUTO: 8.39 K/UL — SIGNIFICANT CHANGE UP (ref 3.8–10.5)

## 2023-03-24 PROCEDURE — 71275 CT ANGIOGRAPHY CHEST: CPT | Mod: 26,MA

## 2023-03-24 PROCEDURE — 74174 CTA ABD&PLVS W/CONTRAST: CPT | Mod: 26,MA

## 2023-03-24 RX ORDER — ONDANSETRON 8 MG/1
4 TABLET, FILM COATED ORAL ONCE
Refills: 0 | Status: COMPLETED | OUTPATIENT
Start: 2023-03-24 | End: 2023-03-24

## 2023-03-24 RX ORDER — SODIUM CHLORIDE 9 MG/ML
1000 INJECTION INTRAMUSCULAR; INTRAVENOUS; SUBCUTANEOUS ONCE
Refills: 0 | Status: COMPLETED | OUTPATIENT
Start: 2023-03-24 | End: 2023-03-24

## 2023-03-24 RX ORDER — PANTOPRAZOLE SODIUM 20 MG/1
40 TABLET, DELAYED RELEASE ORAL ONCE
Refills: 0 | Status: COMPLETED | OUTPATIENT
Start: 2023-03-24 | End: 2023-03-24

## 2023-03-24 RX ORDER — INSULIN DEGLUDEC 100 U/ML
30 INJECTION, SOLUTION SUBCUTANEOUS
Qty: 0 | Refills: 0 | DISCHARGE

## 2023-03-24 RX ORDER — FAMOTIDINE 10 MG/ML
1 INJECTION INTRAVENOUS
Qty: 30 | Refills: 0
Start: 2023-03-24 | End: 2023-04-22

## 2023-03-24 RX ORDER — SEMAGLUTIDE 0.68 MG/ML
0 INJECTION, SOLUTION SUBCUTANEOUS
Qty: 0 | Refills: 0 | DISCHARGE

## 2023-03-24 RX ORDER — SUCRALFATE 1 G
1 TABLET ORAL
Qty: 21 | Refills: 0
Start: 2023-03-24 | End: 2023-03-30

## 2023-03-24 RX ORDER — INSULIN LISPRO 100/ML
0 VIAL (ML) SUBCUTANEOUS
Qty: 0 | Refills: 0 | DISCHARGE

## 2023-03-24 RX ADMIN — ONDANSETRON 4 MILLIGRAM(S): 8 TABLET, FILM COATED ORAL at 04:09

## 2023-03-24 RX ADMIN — SODIUM CHLORIDE 1000 MILLILITER(S): 9 INJECTION INTRAMUSCULAR; INTRAVENOUS; SUBCUTANEOUS at 04:05

## 2023-03-24 RX ADMIN — Medication 30 MILLILITER(S): at 04:04

## 2023-03-24 RX ADMIN — PANTOPRAZOLE SODIUM 40 MILLIGRAM(S): 20 TABLET, DELAYED RELEASE ORAL at 04:04

## 2023-03-24 RX ADMIN — ONDANSETRON 4 MILLIGRAM(S): 8 TABLET, FILM COATED ORAL at 11:20

## 2023-03-24 RX ADMIN — SODIUM CHLORIDE 1000 MILLILITER(S): 9 INJECTION INTRAMUSCULAR; INTRAVENOUS; SUBCUTANEOUS at 05:15

## 2023-03-24 NOTE — ED ADULT NURSE NOTE - OBJECTIVE STATEMENT
PT presents with N/V, epigastric  burning 9/10. PT states she was having nausea and drank some tea and started vomiting blood. Pt has hx of dm. Pt denies sob, cp.

## 2023-03-24 NOTE — ED PROVIDER NOTE - CLINICAL SUMMARY MEDICAL DECISION MAKING FREE TEXT BOX
Patient p/w hematemsis x1 episode.  VSS.  Airway patent.  Will assess with labs, CT chest and abd to r/o esophageal perf, active extravasation, treat for PUD, reassess with PO challenge. Patient p/w hematemsis x1 episode.  VSS.  Airway patent.  Will assess with labs, CT chest and abd to r/o esophageal perf, active extravasation, treat for PUD, reassess with PO challenge.    Sign out note:  Lab values reviewed, there are no values which require acute intervention.  Patient not having episodes of hematemesis in ER.   Pending CT chest and abdomen this morning.  If negative, would PO challenge and dc on PPI and GI follow up.  Care endorsed to incoming MD Dr. Parikh for ongoing management. Patient p/w hematemsis x1 episode.  VSS.  Airway patent.  Will assess with labs, CT chest and abd to r/o esophageal perf, active extravasation, treat for PUD, reassess with PO challenge.    Sign out note:  Lab values reviewed, there are no values which require acute intervention.  Patient not having episodes of hematemesis in ER.   Pending CT chest and abdomen this morning.  If negative, would PO challenge and dc on PPI and GI follow up.     CT scans neg for PE, active GI bleed, or intrabdominal etiology. Will dc with GI f/u

## 2023-03-24 NOTE — ED ADULT NURSE NOTE - PAIN: BODY LOCATION
May 2, 2022       Johnson Arriaga MD  [address] [facility] [address]  Via In Basket      Patient: Azeem Rodriguez   YOB: 1937   Date of Visit: 2022       Dear Dr. Michael Kimbrough:    Thank you for referring Keren Whiting to me for evaluation. Below are my notes for this visit with her. If you have questions, please do not hesitate to call me. I look forward to following your patient along with you. Sincerely,        Zi Dorman MD        CC: No Recipients  Zi Dorman MD  2022 11:06 AM  Signed    Office Note    Azeem Rodriguez  : 1937  PCP: Johnson Arriaga MD    Reason for Visit:  Chief Complaint   Patient presents with   â¢ Follow-up     no current cardiac complaints       History of Present Illness:  Azeem Rodriguez is a 79year old woman with PMHx of chronic atrial fibrillation, diastolic CHF and pulmonary HTN. She denies CP and her breathing has been good. She tripped and fell at home. She had a black eye but no internal injury of bleeding. She had two more falls. Her primary care physician stopped her ecasa and cut down her eliquis dose. She has been working with physical therapy and her daughter tells me that her gait and balance are much better, no recurrent falls since she started PT. She denies chest pain or angina. She denies GI or  bleeding. She notes occ mild lightheadedness when her BP is running low. Overall she feels like she is doing very well.        PMHx:  Past Medical History:   Diagnosis Date   â¢ Atrial fibrillation (CMS/HCC)    â¢ Congestive cardiac failure (CMS/HCC)    â¢ Thyroid disease        PSHx:  Past Surgical History:   Procedure Laterality Date   â¢ Case request clinic to cath/vasc      2018 and normal   â¢ Hysterectomy         Family Hx:  Family History   Problem Relation Age of Onset   â¢ Stroke Father    â¢ Coronary Artery Disease Neg Hx          Negative for premature CAD. Social Hx:   reports that she has never smoked. She has never used smokeless tobacco. She reports previous alcohol use. She reports that she does not use drugs. Allergies:  ALLERGIES:   Allergen Reactions   â¢ Penicillins RASH     CLASS       Medications:  Current Outpatient Medications   Medication Sig Dispense Refill   â¢ dilTIAZem (TIAZAC) 240 MG 24 hr capsule Take 1 capsule by mouth once daily in the morning 30 capsule 0   â¢ Eliquis 5 MG Tab TAKE 1 TABLET BY MOUTH EVERY 12 HOURS AFTER A MEAL (Patient taking differently: Take 2.5 mg by mouth every 12 hours. ) 60 tablet 3   â¢ potassium CHLORIDE (KLOR-CON M) 20 MEQ alba ER tablet Take 1 tablet by mouth once daily (Patient taking differently: Take 10 mEq by mouth daily. 1/2 tab daily) 30 tablet 0   â¢ olmesartan (BENICAR) 20 MG tablet Take 1/2 (one-half) tablet by mouth once daily 45 tablet 3   â¢ furosemide (LASIX) 20 MG tablet Take 20 mg by mouth daily. â¢ metoPROLOL succinate (TOPROL-XL) 25 MG 24 hr tablet Take 25 mg by mouth every other day. â¢ spironolactone (ALDACTONE) 25 MG tablet TAKE 1/2 (ONE-HALF) TABLET BY MOUTH THREE TIMES A WEEK 40 tablet 2   â¢ Omega-3 Fatty Acids (Fish Oil) 1000 MG capsule Take 1 g by mouth daily. â¢ levothyroxine (SYNTHROID, LEVOTHROID) 112 MCG tablet Take 112 mcg by mouth daily. â¢ Plant Sterols and Stanols (CHOLESTOFF PLUS) 450 MG Cap daily     â¢ gabapentin (NEURONTIN) 100 MG capsule Take 1 capsule by mouth 2 times daily. 60 capsule 0     No current facility-administered medications for this visit. Review of Systems:  Review of Systems   Constitutional: Negative for chills, fever, weight gain and weight loss. HENT: Negative for hearing loss. Eyes: Negative for double vision. Patient denies significant visual changes   Cardiovascular: Negative for chest pain, claudication, dyspnea on exertion, near-syncope and syncope.         Negative except for what's indicated in HPI "  Respiratory: Negative for cough, hemoptysis and shortness of breath. Hematologic/Lymphatic: Does not bruise/bleed easily. Skin: Negative for rash and suspicious lesions. Musculoskeletal: Positive for falls. Negative for arthritis. Gastrointestinal: Negative for hematochezia and melena. Genitourinary: Negative for hematuria. Neurological: Positive for dizziness and loss of balance. Negative for focal weakness. No localized deficits   Psychiatric/Behavioral: Negative for depression and hallucinations. Allergic/Immunologic: Negative for environmental allergies. No new food allergies     All other systems were reviewed and were negative. Physical Exam:  Visit Vitals  /58 (BP Location: LUE - Left upper extremity, Patient Position: Sitting, Cuff Size: Large Adult)   Pulse 84   Resp 16   Ht 5' 1"" (1.549 m)   BMI 39.78 kg/mÂ²       Gen: no acute distress, older, with her daughter  Neuro: alert and oriented x 3  HEENT: symmetric no droop  Mouth:  mask in place  Neck: no bruits  CV: irreg ireg s1s2  Pulm: clear;  no rhonchi  GI: soft, non-tender, no rebound or guarding  :  no cva tenderness  Gait:  slow but steady, uses a cane  Ext: 1+ edema B  Skin: no rashes      Data:   Nuc 2/2020 normal ef 77;  no ischemia  Â   echo 12/2019:  EF 60-65%, severe LAE, PA 57 mmHg, mild MR  Â   BNP levels [id number]  Â   Seen by [facility] in late 3081, for diastolic CHF and pulmonary HTN. The workup for amyloid was negative. Assessment/Plan:  Angela Petersen is a 79year old woman with PMHx of chronic atrial fibrillation, diastolic CHF and pulmonary HTN. She denies CP and her breathing has been good. She tripped and fell at home. She had a black eye but no internal injury of bleeding. She had two more falls. Her primary care physician stopped her ecasa and cut down her eliquis dose.   She has been working with physical therapy and her daughter tells me that her gait and balance are much " better, no recurrent falls since she started PT. Diagnosis:  Patient Active Problem List   Diagnosis   â¢ Chronic atrial fibrillation (CMS/HCC)   â¢ Long term (current) use of anticoagulants   â¢ Acquired hypothyroidism   â¢ Generalized edema   â¢ Chronic diastolic congestive heart failure (CMS/HCC)   â¢ Pulmonary hypertension (CMS/HCC)   â¢ Chest pain   â¢ Mild mitral regurgitation   â¢ Dyspnea on exertion   â¢ Hypertension, essential         IMPRESSION:  Chronic afib  Eliquis  OROZCO  HTN  Diastolic dysfunction  Pulmonary HTN PA 57 mmHg  LE edema  Hypothyroid      PLAN:    Breathing is good, no recent issues with dyspnea or diastolic CHF    Continue eliquis for afib    Agree with stopping ecasa and decreasing eliquis to 2.5 bid given a couple of falls at home. Most of our time today was spend discussing pros and cons of anticoagulation with pt and her daughter. At this point in time we will leave her on low dose eliquis. Her daughter will let me know if she has more falls at home. We briefly talked about watchman as an option but given her age and overall condition the patient and family wish for conservative therapy rather than an invasive approach.       TRUNG DAVIS one year      Andrews Roper MD  05/02/22 epigastrium

## 2023-03-24 NOTE — ED ADULT NURSE REASSESSMENT NOTE - NS ED NURSE REASSESS COMMENT FT1
report received from BEN Estrada. Patient is a&ox4 with IV placed and intact. patient given medication and blood collected and sent. NAD noted at this time

## 2023-03-24 NOTE — ED PROVIDER NOTE - NSFOLLOWUPINSTRUCTIONS_ED_ALL_ED_FT
You were seen in the ER for nausea/vomiting, with subsequent episodes with blood. Your blood work showed no major abnormalities. Your CT scan showed no signs of blood clots in the lung, active gastrointestinal bleed, or intrabdominal pathology. Clinic information has been provided for you to call and schedule an appointment with a GI (gastrointestinal) doctor. Medications have been sent to aid with your symptoms. Avoid acidic, spicy, or greasy foods until you see the GI doctor for further evaluation. Return to the ER if you have new or worsening symptoms.

## 2023-03-24 NOTE — ED PROVIDER NOTE - OBJECTIVE STATEMENT
Pertinent PMH/PSH/FHx/SHx and Review of Systems contained within:  Patient presents to the ED for hematemesis.  Patient says that she was on her way to work tonight when she was gripped with nausea and started throwing up.  Had some epigastric discomfort but no significant abdominal pain.  The first 2 times she vomited it was nonbloody but the third time she had moderate blood in vomitus.  She showed a picture of the blood in sink.  She denies any chest pain, sob, use of estrogen or OCPs, denies hemoptysis, leg swelling.  She denies melena or diarrhea.  Patient denies EtOH/tobacco/illicit substance use.  Currently with nausea but not actively vomiting.     Relevant PMHx/SHx/SOCHx/FAMH:  DM, denies psh    ROS: No fever/chills, No headache/photophobia/eye pain/changes in vision, No ear pain/sore throat/dysphagia, No chest pain/palpitations, no SOB/cough/wheeze/stridor, No abdominal pain, No D/melena, no dysuria/frequency/discharge, No neck/back pain, no rash, no changes in neurological status/function.

## 2023-03-24 NOTE — ED PROVIDER NOTE - PHYSICAL EXAMINATION
Gen: Alert, NAD, well appearing  Head: NC, AT, EOMI, normal lids/conjunctiva  ENT: normal hearing, patent oropharynx without erythema/exudate, uvula midline, no blood in oropharynx  Neck: +supple, no tenderness/meningismus/JVD, +Trachea midline  Pulm: Bilateral BS, normal resp effort, no wheeze/stridor/retractions  CV: RRR, no M/R/G, +dist pulses  Abd: soft, NT/ND, Negative [place] signs, +BS, no palpable masses  Mskel: no edema/erythema/cyanosis  Skin: no rash, warm/dry  Neuro: AAOx3, no apparent sensory/motor deficits, coordination intact

## 2023-03-24 NOTE — ED PROVIDER NOTE - NS_EDPROVIDERDISPOUSERTYPE_ED_A_ED
1. Reassured about the foot symptoms.     2. Continue the escitalopram 10 mg po daily.     3. Follow up in 6 months, and sooner as needed.    Attending Attestation (For Attendings USE Only)...

## 2023-03-24 NOTE — ED PROVIDER NOTE - NSFOLLOWUPCLINICS_GEN_ALL_ED_FT
Gastroenterology at [facility]  Gastroenterology  [address] [place] [address]  Phone: [phone]  Fax:   Follow Up Time: 7-10 Days    [facility]  Gastroenterology  [address] [place] [id number]  Phone: [phone]  Fax:   Follow Up Time: 7-10 Days

## 2023-03-24 NOTE — ED PROVIDER NOTE - PROGRESS NOTE DETAILS
Pt currently asymptomatic. CT PE CTA A/P neg for surgical/infectious etiology, active GI bleed. pt tolerated PO. Will send with GI f/u. Pt agreeable with dc and understands return precautions given.

## 2023-03-24 NOTE — ED ADULT NURSE REASSESSMENT NOTE - NS ED NURSE REASSESS COMMENT FT1
patient is a&ox4 with IV intact. Patient denies any nauseas or pain at this time. NAD noted at this time

## 2023-03-24 NOTE — ED PROVIDER NOTE - PATIENT PORTAL LINK FT
You can access the FollowMyHealth Patient Portal offered by [facility] by registering at the following website: [email or web address]. By joining [facility]’s FollowMyHealth portal, you will also be able to view your health information using other applications (apps) compatible with our system.

## 2023-03-28 ENCOUNTER — RESULT REVIEW (OUTPATIENT)
Age: 43
End: 2023-03-28

## 2023-03-28 ENCOUNTER — OUTPATIENT (OUTPATIENT)
Dept: OUTPATIENT SERVICES | Facility: HOSPITAL | Age: 43
LOS: 1 days | End: 2023-03-28
Payer: COMMERCIAL

## 2023-03-28 ENCOUNTER — APPOINTMENT (OUTPATIENT)
Dept: MAMMOGRAPHY | Facility: IMAGING CENTER | Age: 43
End: 2023-03-28
Payer: COMMERCIAL

## 2023-03-28 ENCOUNTER — APPOINTMENT (OUTPATIENT)
Dept: ULTRASOUND IMAGING | Facility: IMAGING CENTER | Age: 43
End: 2023-03-28
Payer: COMMERCIAL

## 2023-03-28 DIAGNOSIS — Z98.890 OTHER SPECIFIED POSTPROCEDURAL STATES: Chronic | ICD-10-CM

## 2023-03-28 DIAGNOSIS — Z00.8 ENCOUNTER FOR OTHER GENERAL EXAMINATION: ICD-10-CM

## 2023-03-28 DIAGNOSIS — R92.2 INCONCLUSIVE MAMMOGRAM: ICD-10-CM

## 2023-03-28 DIAGNOSIS — Z12.39 ENCOUNTER FOR OTHER SCREENING FOR MALIGNANT NEOPLASM OF BREAST: ICD-10-CM

## 2023-03-28 DIAGNOSIS — Z98.891 HISTORY OF UTERINE SCAR FROM PREVIOUS SURGERY: Chronic | ICD-10-CM

## 2023-03-28 PROCEDURE — 77063 BREAST TOMOSYNTHESIS BI: CPT | Mod: 26

## 2023-03-28 PROCEDURE — 77067 SCR MAMMO BI INCL CAD: CPT | Mod: 26

## 2023-03-28 PROCEDURE — 76641 ULTRASOUND BREAST COMPLETE: CPT | Mod: 26,50

## 2023-03-28 PROCEDURE — 77063 BREAST TOMOSYNTHESIS BI: CPT

## 2023-03-28 PROCEDURE — 76641 ULTRASOUND BREAST COMPLETE: CPT

## 2023-03-28 PROCEDURE — 77067 SCR MAMMO BI INCL CAD: CPT

## 2023-04-27 ENCOUNTER — APPOINTMENT (OUTPATIENT)
Dept: GASTROENTEROLOGY | Facility: CLINIC | Age: 43
End: 2023-04-27
Payer: COMMERCIAL

## 2023-04-27 VITALS
OXYGEN SATURATION: 99 % | WEIGHT: 220 LBS | BODY MASS INDEX: 35.36 KG/M2 | TEMPERATURE: 98.1 F | DIASTOLIC BLOOD PRESSURE: 76 MMHG | SYSTOLIC BLOOD PRESSURE: 117 MMHG | HEART RATE: 89 BPM | HEIGHT: 66 IN

## 2023-04-27 DIAGNOSIS — K92.0 HEMATEMESIS: ICD-10-CM

## 2023-04-27 PROCEDURE — 99204 OFFICE O/P NEW MOD 45 MIN: CPT

## 2023-04-27 RX ORDER — SEMAGLUTIDE 1.34 MG/ML
INJECTION, SOLUTION SUBCUTANEOUS
Refills: 0 | Status: DISCONTINUED | COMMUNITY
End: 2023-04-27

## 2023-04-27 RX ORDER — METRONIDAZOLE 7.5 MG/G
0.75 GEL VAGINAL
Qty: 1 | Refills: 0 | Status: DISCONTINUED | COMMUNITY
Start: 2023-01-23 | End: 2023-04-27

## 2023-04-27 RX ORDER — INSULIN DEGLUDEC INJECTION 200 U/ML
INJECTION, SOLUTION SUBCUTANEOUS
Refills: 0 | Status: DISCONTINUED | COMMUNITY
End: 2023-04-27

## 2023-04-27 RX ORDER — FLUCONAZOLE 150 MG/1
150 TABLET ORAL
Qty: 1 | Refills: 0 | Status: DISCONTINUED | COMMUNITY
Start: 2023-01-23 | End: 2023-04-27

## 2023-04-27 RX ORDER — ACETAMINOPHEN 325 MG/1
TABLET, FILM COATED ORAL
Refills: 0 | Status: DISCONTINUED | COMMUNITY
End: 2023-04-27

## 2023-04-27 NOTE — REVIEW OF SYSTEMS
[Feeling Poorly] : not feeling poorly [Feeling Tired] : not feeling tired [Red Eyes] : eyes not red [Scleral Icterus (Yellow Eyes)] : no scleral icterus [Sore Throat] : no sore throat [Hoarseness] : no hoarseness [Chest Pain] : no chest pain [Palpitations] : no palpitations [Shortness Of Breath] : no shortness of breath [Cough] : no cough [As Noted in HPI] : as noted in HPI [Joint Stiffness] : no joint stiffness [Limb Swelling] : no limb swelling [Itching] : no itching [Jaundice (yellowing of skin)] : no jaundice [Dizziness] : no dizziness [Fainting] : no fainting [Anxiety] : no anxiety [Depression] : no depression [Easy Bleeding] : no tendency for easy bleeding [Easy Bruising] : no tendency for easy bruising

## 2023-04-27 NOTE — ASSESSMENT
[FreeTextEntry1] : Impression:\par # Hematemesis: Resolved, given history - suspect Sylvia-Mariano Tear vs Esophagitis\par \par Plan.\par - Plan for Upper Endoscopy\par - Alternatives for procedure and risks of infection, perforation, bleeding, abdominal pain & adverse reaction to anesthesia discussed.\par - Preparations for the procedure discussed\par - Continue PPI\par - Educated on need for colonoscopy at age 44

## 2023-04-27 NOTE — PHYSICAL EXAM
[Alert] : alert [Normal Voice/Communication] : normal voice/communication [Healthy Appearing] : healthy appearing [No Acute Distress] : no acute distress [Sclera] : the sclera and conjunctiva were normal [Hearing Threshold Finger Rub Not [place]y] : hearing was normal [Normal Appearance] : the appearance of the neck was normal [No Respiratory Distress] : no respiratory distress [No Acc Muscle Use] : no accessory muscle use [Heart Rate And Rhythm] : heart rate was normal and rhythm regular [Respiration, Rhythm And Depth] : normal respiratory rhythm and effort [Normal S1, S2] : normal S1 and S2 [Murmurs] : no murmurs [Bowel Sounds] : normal bowel sounds [No Masses] : no abdominal mass palpated [Abdomen Tenderness] : non-tender [Abdomen Soft] : soft [Cervical Lymph Nodes Enlarged Posterior Bilaterally] : no posterior cervical lymphadenopathy [Cervical Lymph Nodes Enlarged Anterior Bilaterally] : no anterior cervical lymphadenopathy [Abnormal Walk] : normal gait [Involuntary Movements] : no involuntary movements were seen [] : no rash [Skin Lesions] : no skin lesions [No Focal Deficits] : no focal deficits [Motor Exam] : the motor exam was normal [Oriented To Time, Place, And Person] : oriented to person, place, and time [Normal Affect] : the affect was normal [Normal Mood] : the mood was normal

## 2023-04-27 NOTE — HISTORY OF PRESENT ILLNESS
[FreeTextEntry1] : 43 year old woman with hx of DM presents for evaluation of hematemesis.\par \par Patient presented to the ED with hematemesis. She was in her usual state of health and was preparing to go to work. She reportedly had 3 episodes of NBNB emesis, followed by episodes of hematemesis. She went to the ED and her blood work and CT were relatively unremarkable so she was discharged home with PPI. Patient denies fevers, chills, chest pain, SOB,  diarrhea, melena, hematochezia, dysphagia, odynophagia, headache, dizziness, abdominal pain and recent travel. She denies further episodes of nausea, vomiting or hematemesis and has not had these symptoms previously.

## 2023-05-01 ENCOUNTER — OUTPATIENT (OUTPATIENT)
Dept: OUTPATIENT SERVICES | Facility: HOSPITAL | Age: 43
LOS: 1 days | Discharge: HOME | End: 2023-05-01
Payer: COMMERCIAL

## 2023-05-01 ENCOUNTER — RESULT REVIEW (OUTPATIENT)
Age: 43
End: 2023-05-01

## 2023-05-01 ENCOUNTER — APPOINTMENT (OUTPATIENT)
Dept: GASTROENTEROLOGY | Facility: HOSPITAL | Age: 43
End: 2023-05-01

## 2023-05-01 VITALS
HEART RATE: 95 BPM | OXYGEN SATURATION: 96 % | SYSTOLIC BLOOD PRESSURE: 123 MMHG | DIASTOLIC BLOOD PRESSURE: 84 MMHG | RESPIRATION RATE: 14 BRPM

## 2023-05-01 VITALS
OXYGEN SATURATION: 99 % | RESPIRATION RATE: 14 BRPM | SYSTOLIC BLOOD PRESSURE: 128 MMHG | DIASTOLIC BLOOD PRESSURE: 98 MMHG | HEART RATE: 77 BPM | TEMPERATURE: 208 F | WEIGHT: 293 LBS

## 2023-05-01 DIAGNOSIS — K92.0 HEMATEMESIS: ICD-10-CM

## 2023-05-01 DIAGNOSIS — Z98.890 OTHER SPECIFIED POSTPROCEDURAL STATES: Chronic | ICD-10-CM

## 2023-05-01 DIAGNOSIS — Z98.891 HISTORY OF UTERINE SCAR FROM PREVIOUS SURGERY: Chronic | ICD-10-CM

## 2023-05-01 LAB
GLUCOSE BLDC GLUCOMTR-MCNC: 112 MG/DL — HIGH (ref 70–99)
GLUCOSE BLDC GLUCOMTR-MCNC: 94 MG/DL — SIGNIFICANT CHANGE UP (ref 70–99)
HCG UR QL: NEGATIVE — SIGNIFICANT CHANGE UP

## 2023-05-01 PROCEDURE — 43239 EGD BIOPSY SINGLE/MULTIPLE: CPT

## 2023-05-01 PROCEDURE — 88312 SPECIAL STAINS GROUP 1: CPT | Mod: 26

## 2023-05-01 PROCEDURE — 88305 TISSUE EXAM BY PATHOLOGIST: CPT | Mod: 26

## 2023-05-01 PROCEDURE — 88342 IMHCHEM/IMCYTCHM 1ST ANTB: CPT | Mod: 26

## 2023-05-01 RX ORDER — SODIUM CHLORIDE 9 MG/ML
500 INJECTION, SOLUTION INTRAVENOUS
Refills: 0 | Status: DISCONTINUED | OUTPATIENT
Start: 2023-05-01 | End: 2023-05-16

## 2023-05-01 NOTE — ASU PREOP CHECKLIST, PEDIATRIC - NS PREOP CHK INSULIN PUMP THERAPY
[address] [id number] [phone]    2018      Name: Skylar Colmenares  : 2013  [address]  [facility] [address]  [phone] (home) none (work)    Parent/Guardian: Oli Colmenares and Yessi Colmenares      Date of last physical exam: 17  How long have you been seeing this child? Since birth  How frequently do you see this child when she is not ill? Every well child exam  Does this child have any allergies (including allergies to medication)? Review of patient's allergies indicates no known allergies.  Is a modified diet necessary? No  Is any condition present that might result in an emergency? No  What is the status of the child's Vision? normal for age  What is the status of the child's Hearing? normal for age  What is the status of the child's Speech? normal for age  List of important health problems--indicate if you or another medical source follows:  None  Will any health issues require special attention at the center?  No  Other information helpful to the  program: Well child with normal growth and development.                ____________________________________________  Morgan Montero MD    n/a

## 2023-05-01 NOTE — ASU PATIENT PROFILE, ADULT - FALL HARM RISK - UNIVERSAL INTERVENTIONS
Bed in lowest position, wheels locked, appropriate side rails in place/Call bell, personal items and telephone in reach/Instruct patient to call for assistance before getting out of bed or chair/Non-slip footwear when patient is out of bed/[place] to call system/Physically safe environment - no spills, clutter or unnecessary equipment/Purposeful Proactive Rounding/Room/bathroom lighting operational, light cord in reach

## 2023-05-01 NOTE — PRE PROCEDURE NOTE - PRE PROCEDURE EVALUATION
Attending Physician:  Dr. Walters    Procedure: EGD    Indication for Procedure: Hematemesis  ________________________________________________________  PAST MEDICAL & SURGICAL HISTORY:  DM (diabetes mellitus), type 2      Obesity  BMI 32.9      History of   x3      S/P arthroscopy of left shoulder      S/P excision of lipoma  left upper back-       History of D&C  missed  10/2020        ALLERGIES:  morphine (Hives)  Augmentin (Diarrhea)    HOME MEDICATIONS:  HumaLOG 100 units/mL injectable solution:     AICD/PPM: [ ] yes   [ ] no    PERTINENT LAB DATA:                      PHYSICAL EXAMINATION:      Weight (kg): 479.6T(C): 36.4  HR: 77  BP: 128/98  RR: 14  SpO2: 99%    Constitutional: NAD  HEENT: PERRLA, EOMI,    Neck:  No JVD  Respiratory: CTAB/L  Cardiovascular: S1 and S2  Gastrointestinal: BS+, soft, NT/ND  Extremities: No peripheral edema  Neurological: A/O x 3, no focal deficits  Psychiatric: Normal mood, normal affect  Skin: No rashes    ASA Class: I [ ]  II [ ]  III [ x ]  IV [ ]    COMMENTS:    The patient is a suitable candidate for the planned procedure unless box checked [ ]  No, explain:

## 2023-05-01 NOTE — ASU PATIENT PROFILE, ADULT - CENTRAL VENOUS CATHETER
Comprehensive Nutrition Assessment    Type and Reason for Visit:  Initial,Positive Nutrition Screen (wt loss, poor appetite, N/V)    Nutrition Recommendations/Plan:   Will provide Regular diet with Magic Cup supplements on all trays. Nutrition Assessment:  Pt admitted due to exacerbation of COPD. Pt with 28# wt loss over the past 7 months. She does not like Ensure supplements but is willing to try Magic Cup. Malnutrition Assessment:  Malnutrition Status:  Severe malnutrition    Context:  Chronic Illness     Findings of the 6 clinical characteristics of malnutrition:  Energy Intake:  7 - 75% or less estimated energy requirements for 1 month or longer  Weight Loss:  7 - Greater than 20% over 1 year     Body Fat Loss:   (moderate) Triceps   Muscle Mass Loss:   (moderate) Hand (interosseous)  Fluid Accumulation:   (mild to moderate) Extremities   Strength:  Not Performed    Estimated Daily Nutrient Needs:  Energy (kcal): [place] x 1.2-1.3= 8339-1718 kcal; Weight Used for Energy Requirements:        Protein (g):  2g/kg= 115-120 g; Weight Used for Protein Requirements:  Ideal          Nutrition Related Findings:  edema: RLE +1 Pitting, LLE +2 Pitting, Labs/Meds: Reviewed, PMH: Lung Ca stage IV with chemo/radiation, COPD      Wounds:  None       Current Nutrition Therapies:    ADULT DIET;  Regular  ADULT ORAL NUTRITION SUPPLEMENT; Breakfast, Lunch, Dinner; Frozen Oral Supplement    Anthropometric Measures:  · Height: 5' 6\" (167.6 cm)  · Current Body Weight: 108 lb (49 kg)   · Admission Body Weight: 108 lb (49 kg)    · Usual Body Weight: 136 lb (61.7 kg)     · Ideal Body Weight: 130 lbs; BMI: 17.4  · BMI Categories: Underweight (BMI less than 18.5)       Nutrition Diagnosis:   · Severe malnutrition related to catabolic illness,inadequate protein-energy intake as evidenced by poor intake prior to admission,weight loss greater than or equal to 20% in 1 year,moderate loss of subcutaneous fat,moderate muscle loss    Nutrition Interventions:   Food and/or Nutrient Delivery:  Continue Current Diet,Modify Oral Nutrition Supplement  Nutrition Education/Counseling:  Education completed (discussed supplements)   Coordination of Nutrition Care:  Continue to monitor while inpatient    Goals:  po intake greater than 50%       Nutrition Monitoring and Evaluation:   Food/Nutrient Intake Outcomes:  Food and Nutrient Intake,Supplement Intake  Physical Signs/Symptoms Outcomes:  Biochemical Data,GI Status,Fluid Status or Edema,Skin,Weight     Discharge Planning:    Continue current diet     Electronically signed by Claribel Chapman RD, LD on 2/1/22 at 2:46 PM EST    Contact: [phone] no

## 2023-05-09 LAB — SURGICAL PATHOLOGY STUDY: SIGNIFICANT CHANGE UP

## 2023-05-11 DIAGNOSIS — B37.81 CANDIDAL ESOPHAGITIS: ICD-10-CM

## 2023-08-14 ENCOUNTER — APPOINTMENT (OUTPATIENT)
Dept: OBGYN | Facility: CLINIC | Age: 43
End: 2023-08-14

## 2023-08-14 ENCOUNTER — OUTPATIENT (OUTPATIENT)
Dept: OUTPATIENT SERVICES | Facility: HOSPITAL | Age: 43
LOS: 1 days | End: 2023-08-14
Payer: COMMERCIAL

## 2023-08-14 ENCOUNTER — RESULT REVIEW (OUTPATIENT)
Age: 43
End: 2023-08-14

## 2023-08-14 ENCOUNTER — ASOB RESULT (OUTPATIENT)
Age: 43
End: 2023-08-14

## 2023-08-14 ENCOUNTER — APPOINTMENT (OUTPATIENT)
Dept: OBGYN | Facility: CLINIC | Age: 43
End: 2023-08-14
Payer: COMMERCIAL

## 2023-08-14 ENCOUNTER — APPOINTMENT (OUTPATIENT)
Dept: ULTRASOUND IMAGING | Facility: CLINIC | Age: 43
End: 2023-08-14
Payer: COMMERCIAL

## 2023-08-14 VITALS
HEIGHT: 66 IN | BODY MASS INDEX: 33.75 KG/M2 | SYSTOLIC BLOOD PRESSURE: 117 MMHG | HEART RATE: 92 BPM | WEIGHT: 210 LBS | OXYGEN SATURATION: 99 % | DIASTOLIC BLOOD PRESSURE: 80 MMHG

## 2023-08-14 DIAGNOSIS — Z98.890 OTHER SPECIFIED POSTPROCEDURAL STATES: Chronic | ICD-10-CM

## 2023-08-14 DIAGNOSIS — Z00.8 ENCOUNTER FOR OTHER GENERAL EXAMINATION: ICD-10-CM

## 2023-08-14 PROCEDURE — 76856 US EXAM PELVIC COMPLETE: CPT

## 2023-08-14 PROCEDURE — 99213 OFFICE O/P EST LOW 20 MIN: CPT

## 2023-08-14 PROCEDURE — 76830 TRANSVAGINAL US NON-OB: CPT

## 2023-08-14 PROCEDURE — 76830 TRANSVAGINAL US NON-OB: CPT | Mod: 26

## 2023-08-14 PROCEDURE — 36415 COLL VENOUS BLD VENIPUNCTURE: CPT

## 2023-08-14 PROCEDURE — 76856 US EXAM PELVIC COMPLETE: CPT | Mod: 26,59

## 2023-08-14 NOTE — PLAN
[FreeTextEntry1] : 44 YO  with likely mab -ectopic precautions reviewed -rx for pelvic sono (to be done next week) -noemí, T+S done today -rto in 1 week

## 2023-08-14 NOTE — HISTORY OF PRESENT ILLNESS
[FreeTextEntry1] : 42 YO  at 7 5/7 weeks ga by lmp presents with vaginal bleeding. Pt reports lmp was 23. She had some light pink spotting last week. On  the bleeding became heavy like a period. It is not as heavy as when she has had her prior 2 miscarriages. Pt reports cramps but no severe abdominal pain, no heavy bleeding.

## 2023-08-18 NOTE — H&P PST ADULT - PRO ARRIVE FROM
Colposcopy    Date/Time: 8/17/2023 4:21 PM    Performed by: Alexei Fuentes DO  Authorized by: Alexei Fuentes DO    Pre-Procedure  Colposcopy of cervix and upper vagina was performed. Gross examination of the cervix revealed: normal to inspection  Gross examination comments:AGC-NOS pap    Colposcopic Findings  After gross examination through the colposcope, acetic acid was applied and colposcopic evaluation was again performed.   Colposcopic examination was inadequate due to:  Transformation zone not entirely visualized     Acetowhite epithelium was present at   6 o'clock and 12 o'clock       Punctation was present at   12 o'clock  No mosaicism was present     Abnormal vessels were present at   12  o'clock.  Biopsies  Punch biopsies were taken at  6 o'clock and 12 o'clock. ECC was performed with a sharp curette and submitted for pathologic evaluation.        Performance of an endometrial biopsy was indicated based on cytology and clinical history. Cervix was prepped with betadine and an endometrial sampling device inserted into the uterine cavity. Sample obtained and sent to pathology.    Post-Procedure  Mago tolerated the procedure well. There were no complications.    Instructions were given to patient regarding aftercare and follow up.       
Patient notified.  Mago Ivan voiced understanding.  
home

## 2023-08-22 ENCOUNTER — APPOINTMENT (OUTPATIENT)
Dept: OBGYN | Facility: CLINIC | Age: 43
End: 2023-08-22
Payer: COMMERCIAL

## 2023-08-22 VITALS
HEIGHT: 66 IN | BODY MASS INDEX: 33.75 KG/M2 | DIASTOLIC BLOOD PRESSURE: 74 MMHG | WEIGHT: 210 LBS | SYSTOLIC BLOOD PRESSURE: 116 MMHG

## 2023-08-22 DIAGNOSIS — N89.8 OTHER SPECIFIED NONINFLAMMATORY DISORDERS OF VAGINA: ICD-10-CM

## 2023-08-22 DIAGNOSIS — O02.1 MISSED ABORTION: ICD-10-CM

## 2023-08-22 LAB
ABO + RH PNL BLD: NORMAL
HCG SERPL-MCNC: 996 MIU/ML

## 2023-08-22 PROCEDURE — 99213 OFFICE O/P EST LOW 20 MIN: CPT

## 2023-08-22 PROCEDURE — 36415 COLL VENOUS BLD VENIPUNCTURE: CPT

## 2023-08-22 NOTE — HISTORY OF PRESENT ILLNESS
[FreeTextEntry1] : 42 yo  with likely missed ab. Pt reports vaginal bleeding has slowed down since last visit but reports spotting. Pt also reports vaginal discharge with odor. She denies pelvic pain.   OBHx: c/s x 3, mab x 2 PMH: DM2

## 2023-08-22 NOTE — END OF VISIT
[FreeTextEntry3] : I Uche Zheng, acted as a scribe on behalf of Dr. Codi Phelan on 08/22/2023.  All medical entries made by this scribe where at my Dr. Codi Phelan, direction and personally dictated by me on 08/22/2023.  I have reviewed the chart and agree that the record accurately reflects my personal performance of the history, physical exam, assessment, and plan. I have also personally directed, reviewed and agreed with the chart.

## 2023-08-25 DIAGNOSIS — B96.89 ACUTE VAGINITIS: ICD-10-CM

## 2023-08-25 DIAGNOSIS — N76.0 ACUTE VAGINITIS: ICD-10-CM

## 2023-08-25 LAB
CANDIDA VAG CYTO: NOT DETECTED
G VAGINALIS+PREV SP MTYP VAG QL MICRO: DETECTED
HCG SERPL-MCNC: 18 MIU/ML
T VAGINALIS VAG QL WET PREP: NOT DETECTED

## 2023-10-17 ENCOUNTER — APPOINTMENT (OUTPATIENT)
Dept: OBGYN | Facility: CLINIC | Age: 43
End: 2023-10-17
Payer: COMMERCIAL

## 2023-10-17 VITALS
HEIGHT: 66 IN | DIASTOLIC BLOOD PRESSURE: 77 MMHG | SYSTOLIC BLOOD PRESSURE: 114 MMHG | WEIGHT: 212 LBS | BODY MASS INDEX: 34.07 KG/M2

## 2023-10-17 DIAGNOSIS — N93.9 ABNORMAL UTERINE AND VAGINAL BLEEDING, UNSPECIFIED: ICD-10-CM

## 2023-10-17 PROCEDURE — 99214 OFFICE O/P EST MOD 30 MIN: CPT

## 2023-11-06 ENCOUNTER — APPOINTMENT (OUTPATIENT)
Dept: VASCULAR SURGERY | Facility: CLINIC | Age: 43
End: 2023-11-06
Payer: COMMERCIAL

## 2023-11-06 VITALS
OXYGEN SATURATION: 100 % | TEMPERATURE: 97.5 F | SYSTOLIC BLOOD PRESSURE: 117 MMHG | HEIGHT: 66 IN | BODY MASS INDEX: 33.43 KG/M2 | HEART RATE: 80 BPM | DIASTOLIC BLOOD PRESSURE: 79 MMHG | WEIGHT: 208 LBS | RESPIRATION RATE: 16 BRPM

## 2023-11-06 DIAGNOSIS — I78.1 NEVUS, NON-NEOPLASTIC: ICD-10-CM

## 2023-11-06 PROCEDURE — 99203 OFFICE O/P NEW LOW 30 MIN: CPT

## 2023-11-06 RX ORDER — INSULIN GLARGINE 100 [IU]/ML
100 INJECTION, SOLUTION SUBCUTANEOUS
Refills: 0 | Status: DISCONTINUED | COMMUNITY
Start: 2023-04-27 | End: 2023-11-06

## 2023-11-06 RX ORDER — INSULIN LISPRO 100 [IU]/ML
100 INJECTION, SOLUTION INTRAVENOUS; SUBCUTANEOUS
Refills: 0 | Status: DISCONTINUED | COMMUNITY
Start: 2023-04-27 | End: 2023-11-06

## 2023-11-06 RX ORDER — FLUCONAZOLE 150 MG/1
150 TABLET ORAL
Qty: 1 | Refills: 0 | Status: DISCONTINUED | COMMUNITY
Start: 2023-08-25 | End: 2023-11-06

## 2023-11-06 RX ORDER — FLUCONAZOLE 200 MG/1
200 TABLET ORAL
Qty: 15 | Refills: 0 | Status: DISCONTINUED | COMMUNITY
Start: 2023-05-11 | End: 2023-11-06

## 2023-11-06 RX ORDER — METRONIDAZOLE 7.5 MG/G
0.75 GEL VAGINAL
Qty: 1 | Refills: 1 | Status: DISCONTINUED | COMMUNITY
Start: 2023-08-25 | End: 2023-11-06

## 2024-01-22 ENCOUNTER — APPOINTMENT (OUTPATIENT)
Dept: VASCULAR SURGERY | Facility: CLINIC | Age: 44
End: 2024-01-22
Payer: COMMERCIAL

## 2024-01-22 VITALS
HEIGHT: 66 IN | WEIGHT: 205 LBS | HEART RATE: 85 BPM | DIASTOLIC BLOOD PRESSURE: 74 MMHG | TEMPERATURE: 98 F | OXYGEN SATURATION: 100 % | SYSTOLIC BLOOD PRESSURE: 117 MMHG | BODY MASS INDEX: 32.95 KG/M2

## 2024-01-22 DIAGNOSIS — M79.89 OTHER SPECIFIED SOFT TISSUE DISORDERS: ICD-10-CM

## 2024-01-22 PROCEDURE — 93978 VASCULAR STUDY: CPT

## 2024-01-22 PROCEDURE — 99213 OFFICE O/P EST LOW 20 MIN: CPT

## 2024-01-22 PROCEDURE — 93970 EXTREMITY STUDY: CPT

## 2024-01-22 NOTE — DATA REVIEWED
[FreeTextEntry1] : 1/22/2024 - BLE venous duplex Negative for DVT/SVT bilaterally. Mild bilateral CFV reflux and left SFJ reflux only.   1/22/2024 - IVC duplex Negative for DVT in IVC and iliac veins. No iliac vein compression.

## 2024-01-22 NOTE — HISTORY OF PRESENT ILLNESS
[FreeTextEntry1] : ROCHELLE VELAZCO is a 43 year old female who presents for evaluation of swollen ankles.   Referred by Dr. Elisa Allison.  Patient states that she has been having intermittent ankle swelling. She has been having intermittent ankle swelling for many years. The left ankle swells more than the right. Can be normal in the morning but also can wake up with swollen ankles. Patient has been evaluated by cardiology (Dr. Elisa Allison) and was found to have no cardiac reason for leg swelling. Denies leg tightness or heaviness. Reports pain at her bilateral ankles. Patient reports having undergone stress test and echocardiogram and reports results were normal. She has spider veins on the medial and lateral thighs which cause her discomfort.  Personal history of DVT - None Family history of DVT - None Family history of venous insufficiency or varicose veins - None Current compression stocking usage - Intermittent use of compression with some help with swelling.  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   + PMH: DM + PSH: s/p  + FH: denies + SH: never smoker, no etoh use, no illicit substance use  + Aller: morphine  PCP is Dr. Weston Tai. [de-identified] : 1/22/2024 - Pt presents for follow up. She reports continued leg swelling, more on left. When she has swelling, she has pain in her legs. She continues to use compression stockings, which helps her symptoms.

## 2024-01-22 NOTE — PHYSICAL EXAM
[2+] : left 2+ [Ankle Swelling (On Exam)] : present [Ankle Swelling Bilaterally] : bilaterally  [Calm] : calm [Ankle Swelling On The Right] : mild [Varicose Veins Of Lower Extremities] : not present [] : not present [de-identified] : Well-appearing  [de-identified] : EOMI, anicteric  [FreeTextEntry1] : left ankle swelling compared to right [de-identified] : soft, nt, nd  [de-identified] : motor and sensation intact in all four extremities  [de-identified] : no wounds on bilateral feet, bilateral medial thigh spider veins, right lateral and posterior knee spider veins [de-identified] : A&Ox4

## 2024-01-22 NOTE — ASSESSMENT
[FreeTextEntry1] : ROCHELLE VELAZCO is a 43 year old female presents for evaluation.   > Venous insufficiency, CEAP C3 - Reviewed results of duplex w/ patient. No IVC or iliac vein DVT. No iliac vein compression. Mild reflux at bilateral common femoral veins and left saphenofemoral junction only. No leg superficial or deep venous insufficiency.  - My assessment is minimal venous insufficiency without indication for intervention.  - Recommend continued use of compression stockings (20-30 mmhg), leg elevation, and ambulation.  - Patient also interested in sclerotherapy. Given information packet. Patient to call if interested.

## 2024-01-22 NOTE — CONSULT LETTER
[Dear  ___] : Dear  [unfilled], [Please see my note below.] : Please see my note below. [Consult Letter:] : I had the pleasure of evaluating your patient, [unfilled]. [Consult Closing:] : Thank you very much for allowing me to participate in the care of this patient.  If you have any questions, please do not hesitate to contact me. [Sincerely,] : Sincerely, [FreeTextEntry1] : She presents to me for evaluation of leg pain and swelling. On exam, she has left greater than right ankle swelling. She has spider veins. She has palpable pedal pulses. Venous duplex shows mild deep venous insufficiency at the bilateral common femoral veins and left saphenofemoral junction only. No intervention is indicated. I recommend continued use of compression stockings, leg elevation, and ambulation for symptom management. For her spider veins, we briefly discussed sclerotherapy and she was given an information packet on it. She will call if interested.  [FreeTextEntry3] :     Chiqui Aviles MD, FACS, RPVI Division of Vascular & Endovascular Surgery [facility], Department of Surgery

## 2024-09-17 NOTE — H&P PST ADULT - TEMPERATURE IN FAHRENHEIT (DEGREES F)
[Dear  ___] : Dear ~KRISTINA, [Consult Letter:] : I had the pleasure of evaluating your patient, [unfilled]. [Please see my note below.] : Please see my note below. [Consult Closing:] : Thank you very much for allowing me to participate in the care of this patient.  If you have any questions, please do not hesitate to contact me. [Sincerely,] : Sincerely, [DrPaul  ___] : Dr. POWELL [DrPaul ___] : Dr. POWELL [FreeTextEntry2] : Nichole Alicea MD  [FreeTextEntry3] : Richard B. Libman, MD, FRCPC  , Neurology  Co-Director, Stroke Center Professor of Neurology [facility] [employer] [facility] 98.6

## 2024-11-12 NOTE — ED ADULT TRIAGE NOTE - BMI (KG/M2)
Chief Complaint  Vaginal Discharge (Intermittent for 1 week; denies odor) and Vaginal Itching (Denies urinary pain.  Denies urinary frequency.  )    Subjective        Sonja Sierra presents to [facility]  History of Present Illness  Patient presents to the office today with complaints of vaginal discharge for 1 week.  She denies odor.  She is reporting vaginal itching.  She denies dysuria or frequency.  During office visit she does report odor with urination.  Blood pressure today 124/70.    Patient appointment at 415.  I called patient to let her know that she can come in early due to our lab closes at 4:00.  Patient was still at work not able to come in any earlier.      Patient was not happy that her appointment is at 415 and  our lab is not open.  I apologized multiple times that unfortunately we do not make the appointments and I am unable to further treat based on her symptoms due to the vaginal discharge she will need to have a vaginal swab to rule out bacterial vaginosis and vaginal Candida's.    I discussed with patient Diflucan will help the common yeast infection and she was adamant that she be swabbed in order to treat.  I agreed with this as if she has bacterial vaginosis she will need the antibiotic Flagyl.    One of the medical assistants helped this patient with providing her with a urine cup to leave a urine sample.  This patient has not been happy since arriving to her office visit knowing that no labs are going to be done.    She left a urine sample and wanted to know what I was going to do as far as treating an infection.  I explained that the urine sample will be checked in the a.m. and sent for culture.    I continued to apologize throughout the visit and the patient said that she would talk to management that she was not properly treated in the office.    As the visit is ending I kept the door open had a medical assistant stand there with me and walked the  "patient upfront.  Manager was out of office at meeting.  Patient then went and rudely talked to our  staff about how she has not been treated properly during this visit and will not come back to this office.   Female  Problem    Vaginal Discharge    Vaginal Itching        Objective   Vital Signs:  /70 (BP Location: Left arm, Patient Position: Sitting, Cuff Size: Large Adult)   Pulse 68   Temp 98 °F (36.7 °C) (Temporal)   Resp 16   Ht 170.2 cm (67.01\")   Wt 89.9 kg (198 lb 3.2 oz)   BMI 31.04 kg/m²   Estimated body mass index is 31.04 kg/m² as calculated from the following:    Height as of this encounter: 170.2 cm (67.01\").    Weight as of this encounter: 89.9 kg (198 lb 3.2 oz).            Physical Exam   Result Review :                Assessment and Plan   Diagnoses and all orders for this visit:    1. Vaginal discharge (Primary)             Follow Up   No follow-ups on file.  Patient was given instructions and counseling regarding her condition or for health maintenance advice. Please see specific information pulled into the AVS if appropriate.             " 33.1

## 2025-04-06 ENCOUNTER — EMERGENCY (EMERGENCY)
Facility: HOSPITAL | Age: 45
LOS: 0 days | Discharge: HOME | End: 2025-04-06
Attending: STUDENT IN AN ORGANIZED HEALTH CARE EDUCATION/TRAINING PROGRAM
Payer: COMMERCIAL

## 2025-04-06 VITALS
DIASTOLIC BLOOD PRESSURE: 83 MMHG | HEIGHT: 66 IN | HEART RATE: 84 BPM | RESPIRATION RATE: 16 BRPM | WEIGHT: 214.73 LBS | TEMPERATURE: 98 F | OXYGEN SATURATION: 99 % | SYSTOLIC BLOOD PRESSURE: 121 MMHG

## 2025-04-06 DIAGNOSIS — R51.9 HEADACHE, UNSPECIFIED: ICD-10-CM

## 2025-04-06 DIAGNOSIS — Z88.0 ALLERGY STATUS TO PENICILLIN: ICD-10-CM

## 2025-04-06 DIAGNOSIS — Z98.890 OTHER SPECIFIED POSTPROCEDURAL STATES: Chronic | ICD-10-CM

## 2025-04-06 DIAGNOSIS — E11.9 TYPE 2 DIABETES MELLITUS WITHOUT COMPLICATIONS: ICD-10-CM

## 2025-04-06 DIAGNOSIS — Z63.4 DISAPPEARANCE AND DEATH OF FAMILY MEMBER: ICD-10-CM

## 2025-04-06 DIAGNOSIS — Z98.891 HISTORY OF UTERINE SCAR FROM PREVIOUS SURGERY: Chronic | ICD-10-CM

## 2025-04-06 DIAGNOSIS — R07.89 OTHER CHEST PAIN: ICD-10-CM

## 2025-04-06 LAB
ALBUMIN SERPL ELPH-MCNC: 3.1 G/DL — LOW (ref 3.3–5)
ALP SERPL-CCNC: 104 U/L — SIGNIFICANT CHANGE UP (ref 40–120)
ALT FLD-CCNC: 34 U/L — SIGNIFICANT CHANGE UP (ref 12–78)
ANION GAP SERPL CALC-SCNC: 4 MMOL/L — LOW (ref 5–17)
AST SERPL-CCNC: 23 U/L — SIGNIFICANT CHANGE UP (ref 15–37)
BASOPHILS # BLD AUTO: 0.03 K/UL — SIGNIFICANT CHANGE UP (ref 0–0.2)
BASOPHILS NFR BLD AUTO: 0.4 % — SIGNIFICANT CHANGE UP (ref 0–2)
BILIRUB SERPL-MCNC: 0.4 MG/DL — SIGNIFICANT CHANGE UP (ref 0.2–1.2)
BUN SERPL-MCNC: 10 MG/DL — SIGNIFICANT CHANGE UP (ref 7–23)
CALCIUM SERPL-MCNC: 9 MG/DL — SIGNIFICANT CHANGE UP (ref 8.5–10.1)
CHLORIDE SERPL-SCNC: 104 MMOL/L — SIGNIFICANT CHANGE UP (ref 96–108)
CO2 SERPL-SCNC: 30 MMOL/L — SIGNIFICANT CHANGE UP (ref 22–31)
CREAT SERPL-MCNC: 0.6 MG/DL — SIGNIFICANT CHANGE UP (ref 0.5–1.3)
EGFR: 112 ML/MIN/1.73M2 — SIGNIFICANT CHANGE UP
EGFR: 112 ML/MIN/1.73M2 — SIGNIFICANT CHANGE UP
EOSINOPHIL # BLD AUTO: 0.05 K/UL — SIGNIFICANT CHANGE UP (ref 0–0.5)
EOSINOPHIL NFR BLD AUTO: 0.7 % — SIGNIFICANT CHANGE UP (ref 0–6)
GLUCOSE SERPL-MCNC: 195 MG/DL — HIGH (ref 70–99)
HCG SERPL-ACNC: <1 MIU/ML — SIGNIFICANT CHANGE UP
HCT VFR BLD CALC: 33.8 % — LOW (ref 34.5–45)
HGB BLD-MCNC: 11.1 G/DL — LOW (ref 11.5–15.5)
IMM GRANULOCYTES NFR BLD AUTO: 0.3 % — SIGNIFICANT CHANGE UP (ref 0–0.9)
LYMPHOCYTES # BLD AUTO: 3.46 K/UL — HIGH (ref 1–3.3)
LYMPHOCYTES # BLD AUTO: 50.2 % — HIGH (ref 13–44)
MCHC RBC-ENTMCNC: 27.2 PG — SIGNIFICANT CHANGE UP (ref 27–34)
MCHC RBC-ENTMCNC: 32.8 G/DL — SIGNIFICANT CHANGE UP (ref 32–36)
MCV RBC AUTO: 82.8 FL — SIGNIFICANT CHANGE UP (ref 80–100)
MONOCYTES # BLD AUTO: 0.51 K/UL — SIGNIFICANT CHANGE UP (ref 0–0.9)
MONOCYTES NFR BLD AUTO: 7.4 % — SIGNIFICANT CHANGE UP (ref 2–14)
NEUTROPHILS # BLD AUTO: 2.82 K/UL — SIGNIFICANT CHANGE UP (ref 1.8–7.4)
NEUTROPHILS NFR BLD AUTO: 41 % — LOW (ref 43–77)
NRBC BLD AUTO-RTO: 0 /100 WBCS — SIGNIFICANT CHANGE UP (ref 0–0)
PLATELET # BLD AUTO: 262 K/UL — SIGNIFICANT CHANGE UP (ref 150–400)
POTASSIUM SERPL-MCNC: 4 MMOL/L — SIGNIFICANT CHANGE UP (ref 3.5–5.3)
POTASSIUM SERPL-SCNC: 4 MMOL/L — SIGNIFICANT CHANGE UP (ref 3.5–5.3)
PROT SERPL-MCNC: 7.4 GM/DL — SIGNIFICANT CHANGE UP (ref 6–8.3)
RBC # BLD: 4.08 M/UL — SIGNIFICANT CHANGE UP (ref 3.8–5.2)
RBC # FLD: 13.6 % — SIGNIFICANT CHANGE UP (ref 10.3–14.5)
SODIUM SERPL-SCNC: 138 MMOL/L — SIGNIFICANT CHANGE UP (ref 135–145)
TROPONIN I, HIGH SENSITIVITY RESULT: 3.4 NG/L — SIGNIFICANT CHANGE UP
WBC # BLD: 6.89 K/UL — SIGNIFICANT CHANGE UP (ref 3.8–10.5)
WBC # FLD AUTO: 6.89 K/UL — SIGNIFICANT CHANGE UP (ref 3.8–10.5)

## 2025-04-06 PROCEDURE — 99285 EMERGENCY DEPT VISIT HI MDM: CPT

## 2025-04-06 PROCEDURE — 93010 ELECTROCARDIOGRAM REPORT: CPT

## 2025-04-06 PROCEDURE — 70450 CT HEAD/BRAIN W/O DYE: CPT | Mod: 26

## 2025-04-06 PROCEDURE — 71046 X-RAY EXAM CHEST 2 VIEWS: CPT | Mod: 26

## 2025-04-06 RX ORDER — METOCLOPRAMIDE HCL 10 MG
10 TABLET ORAL ONCE
Refills: 0 | Status: COMPLETED | OUTPATIENT
Start: 2025-04-06 | End: 2025-04-06

## 2025-04-06 RX ORDER — ACETAMINOPHEN 500 MG/5ML
1 LIQUID (ML) ORAL
Qty: 21 | Refills: 0
Start: 2025-04-06 | End: 2025-04-12

## 2025-04-06 RX ORDER — ACETAMINOPHEN 500 MG/5ML
1000 LIQUID (ML) ORAL ONCE
Refills: 0 | Status: COMPLETED | OUTPATIENT
Start: 2025-04-06 | End: 2025-04-06

## 2025-04-06 RX ADMIN — Medication 10 MILLIGRAM(S): at 05:44

## 2025-04-06 RX ADMIN — Medication 1000 MILLILITER(S): at 05:53

## 2025-04-06 RX ADMIN — Medication 400 MILLIGRAM(S): at 05:45

## 2025-04-06 SDOH — SOCIAL STABILITY - SOCIAL INSECURITY: DISSAPEARANCE AND DEATH OF FAMILY MEMBER: Z63.4

## (undated) DEVICE — DRSG 2X2

## (undated) DEVICE — SALIVA EJECTOR (BLUE)

## (undated) DEVICE — TUBING MEDI-VAC W MAXIGRIP CONNECTORS 1/4"X6'

## (undated) DEVICE — BIOPSY FORCEP RADIAL JAW 4 STANDARD WITH NEEDLE

## (undated) DEVICE — BITE BLOCK ADULT 20 X 27MM (GREEN)

## (undated) DEVICE — LINE BREATHE SAMPLNG

## (undated) DEVICE — BASIN EMESIS 10IN GRADUATED MAUVE

## (undated) DEVICE — CLAMP BX HOT RAD JAW 3

## (undated) DEVICE — GOWN LG

## (undated) DEVICE — CONTAINER FORMALIN 80ML YELLOW

## (undated) DEVICE — UNDERPAD LINEN SAVER 17 X 24"

## (undated) DEVICE — TUBING IV SET GRAVITY 3Y 100" MACRO

## (undated) DEVICE — DRSG BANDAID 0.75X3"

## (undated) DEVICE — LUBRICATING JELLY HR ONE SHOT 3G

## (undated) DEVICE — DENTURE CUP PINK

## (undated) DEVICE — CATH IV SAFE BC 22G X 1" (BLUE)

## (undated) DEVICE — PACK IV START WITH CHG

## (undated) DEVICE — BIOPSY FORCEP COLD DISP

## (undated) DEVICE — ELCTR ECG CONDUCTIVE ADHESIVE

## (undated) DEVICE — DRSG CURITY GAUZE SPONGE 4 X 4" 12-PLY NON-STERILE